# Patient Record
Sex: MALE | Race: WHITE
[De-identification: names, ages, dates, MRNs, and addresses within clinical notes are randomized per-mention and may not be internally consistent; named-entity substitution may affect disease eponyms.]

---

## 2017-11-27 ENCOUNTER — RESULT REVIEW (OUTPATIENT)
Age: 72
End: 2017-11-27

## 2018-11-06 ENCOUNTER — OUTPATIENT (OUTPATIENT)
Dept: OUTPATIENT SERVICES | Facility: HOSPITAL | Age: 73
LOS: 1 days | End: 2018-11-06
Payer: MEDICARE

## 2018-11-06 ENCOUNTER — APPOINTMENT (OUTPATIENT)
Dept: CT IMAGING | Facility: CLINIC | Age: 73
End: 2018-11-06
Payer: MEDICARE

## 2018-11-06 DIAGNOSIS — Z00.8 ENCOUNTER FOR OTHER GENERAL EXAMINATION: ICD-10-CM

## 2018-11-06 PROCEDURE — 70450 CT HEAD/BRAIN W/O DYE: CPT

## 2018-11-06 PROCEDURE — 70450 CT HEAD/BRAIN W/O DYE: CPT | Mod: 26

## 2020-03-11 ENCOUNTER — APPOINTMENT (OUTPATIENT)
Dept: MRI IMAGING | Facility: CLINIC | Age: 75
End: 2020-03-11
Payer: MEDICARE

## 2020-03-11 ENCOUNTER — OUTPATIENT (OUTPATIENT)
Dept: OUTPATIENT SERVICES | Facility: HOSPITAL | Age: 75
LOS: 1 days | End: 2020-03-11
Payer: MEDICARE

## 2020-03-11 DIAGNOSIS — Z00.8 ENCOUNTER FOR OTHER GENERAL EXAMINATION: ICD-10-CM

## 2020-03-11 PROCEDURE — 73721 MRI JNT OF LWR EXTRE W/O DYE: CPT

## 2020-03-11 PROCEDURE — 73721 MRI JNT OF LWR EXTRE W/O DYE: CPT | Mod: 26,RT

## 2021-01-02 ENCOUNTER — EMERGENCY (EMERGENCY)
Facility: HOSPITAL | Age: 76
LOS: 0 days | Discharge: HOME | End: 2021-01-02
Attending: EMERGENCY MEDICINE
Payer: MEDICARE

## 2021-01-02 VITALS
RESPIRATION RATE: 17 BRPM | DIASTOLIC BLOOD PRESSURE: 55 MMHG | OXYGEN SATURATION: 98 % | TEMPERATURE: 98 F | HEART RATE: 72 BPM | SYSTOLIC BLOOD PRESSURE: 172 MMHG

## 2021-01-02 VITALS — WEIGHT: 169.98 LBS | HEIGHT: 67 IN

## 2021-01-02 DIAGNOSIS — E11.51 TYPE 2 DIABETES MELLITUS WITH DIABETIC PERIPHERAL ANGIOPATHY WITHOUT GANGRENE: ICD-10-CM

## 2021-01-02 DIAGNOSIS — M79.661 PAIN IN RIGHT LOWER LEG: ICD-10-CM

## 2021-01-02 DIAGNOSIS — Z87.19 PERSONAL HISTORY OF OTHER DISEASES OF THE DIGESTIVE SYSTEM: ICD-10-CM

## 2021-01-02 DIAGNOSIS — Z95.9 PRESENCE OF CARDIAC AND VASCULAR IMPLANT AND GRAFT, UNSPECIFIED: ICD-10-CM

## 2021-01-02 DIAGNOSIS — I10 ESSENTIAL (PRIMARY) HYPERTENSION: ICD-10-CM

## 2021-01-02 DIAGNOSIS — I70.203 UNSPECIFIED ATHEROSCLEROSIS OF NATIVE ARTERIES OF EXTREMITIES, BILATERAL LEGS: ICD-10-CM

## 2021-01-02 DIAGNOSIS — Z88.8 ALLERGY STATUS TO OTHER DRUGS, MEDICAMENTS AND BIOLOGICAL SUBSTANCES: ICD-10-CM

## 2021-01-02 DIAGNOSIS — Z95.5 PRESENCE OF CORONARY ANGIOPLASTY IMPLANT AND GRAFT: ICD-10-CM

## 2021-01-02 DIAGNOSIS — I25.119 ATHEROSCLEROTIC HEART DISEASE OF NATIVE CORONARY ARTERY WITH UNSPECIFIED ANGINA PECTORIS: ICD-10-CM

## 2021-01-02 DIAGNOSIS — Z90.89 ACQUIRED ABSENCE OF OTHER ORGANS: ICD-10-CM

## 2021-01-02 DIAGNOSIS — E78.5 HYPERLIPIDEMIA, UNSPECIFIED: ICD-10-CM

## 2021-01-02 DIAGNOSIS — Z20.822 CONTACT WITH AND (SUSPECTED) EXPOSURE TO COVID-19: ICD-10-CM

## 2021-01-02 DIAGNOSIS — I74.3 EMBOLISM AND THROMBOSIS OF ARTERIES OF THE LOWER EXTREMITIES: ICD-10-CM

## 2021-01-02 LAB
ALBUMIN SERPL ELPH-MCNC: 4 G/DL — SIGNIFICANT CHANGE UP (ref 3.3–5)
ALP SERPL-CCNC: 102 U/L — SIGNIFICANT CHANGE UP (ref 40–120)
ALT FLD-CCNC: 21 U/L — SIGNIFICANT CHANGE UP (ref 12–78)
ANION GAP SERPL CALC-SCNC: 2 MMOL/L — LOW (ref 5–17)
APTT BLD: 36.3 SEC — HIGH (ref 27.5–35.5)
AST SERPL-CCNC: 22 U/L — SIGNIFICANT CHANGE UP (ref 15–37)
BASOPHILS # BLD AUTO: 0.08 K/UL — SIGNIFICANT CHANGE UP (ref 0–0.2)
BASOPHILS NFR BLD AUTO: 1 % — SIGNIFICANT CHANGE UP (ref 0–2)
BILIRUB SERPL-MCNC: 0.3 MG/DL — SIGNIFICANT CHANGE UP (ref 0.2–1.2)
BUN SERPL-MCNC: 27 MG/DL — HIGH (ref 7–23)
CALCIUM SERPL-MCNC: 9.6 MG/DL — SIGNIFICANT CHANGE UP (ref 8.5–10.1)
CHLORIDE SERPL-SCNC: 108 MMOL/L — SIGNIFICANT CHANGE UP (ref 96–108)
CO2 SERPL-SCNC: 30 MMOL/L — SIGNIFICANT CHANGE UP (ref 22–31)
CREAT SERPL-MCNC: 1.32 MG/DL — HIGH (ref 0.5–1.3)
EOSINOPHIL # BLD AUTO: 0.2 K/UL — SIGNIFICANT CHANGE UP (ref 0–0.5)
EOSINOPHIL NFR BLD AUTO: 2.6 % — SIGNIFICANT CHANGE UP (ref 0–6)
GLUCOSE SERPL-MCNC: 58 MG/DL — LOW (ref 70–99)
HCT VFR BLD CALC: 35.5 % — LOW (ref 39–50)
HGB BLD-MCNC: 11.6 G/DL — LOW (ref 13–17)
IMM GRANULOCYTES NFR BLD AUTO: 0.3 % — SIGNIFICANT CHANGE UP (ref 0–1.5)
INR BLD: 0.96 RATIO — SIGNIFICANT CHANGE UP (ref 0.88–1.16)
LYMPHOCYTES # BLD AUTO: 2.28 K/UL — SIGNIFICANT CHANGE UP (ref 1–3.3)
LYMPHOCYTES # BLD AUTO: 29.7 % — SIGNIFICANT CHANGE UP (ref 13–44)
MCHC RBC-ENTMCNC: 30.3 PG — SIGNIFICANT CHANGE UP (ref 27–34)
MCHC RBC-ENTMCNC: 32.7 GM/DL — SIGNIFICANT CHANGE UP (ref 32–36)
MCV RBC AUTO: 92.7 FL — SIGNIFICANT CHANGE UP (ref 80–100)
MONOCYTES # BLD AUTO: 0.68 K/UL — SIGNIFICANT CHANGE UP (ref 0–0.9)
MONOCYTES NFR BLD AUTO: 8.9 % — SIGNIFICANT CHANGE UP (ref 2–14)
NEUTROPHILS # BLD AUTO: 4.41 K/UL — SIGNIFICANT CHANGE UP (ref 1.8–7.4)
NEUTROPHILS NFR BLD AUTO: 57.5 % — SIGNIFICANT CHANGE UP (ref 43–77)
PLATELET # BLD AUTO: 356 K/UL — SIGNIFICANT CHANGE UP (ref 150–400)
POTASSIUM SERPL-MCNC: 4.5 MMOL/L — SIGNIFICANT CHANGE UP (ref 3.5–5.3)
POTASSIUM SERPL-SCNC: 4.5 MMOL/L — SIGNIFICANT CHANGE UP (ref 3.5–5.3)
PROT SERPL-MCNC: 8.1 GM/DL — SIGNIFICANT CHANGE UP (ref 6–8.3)
PROTHROM AB SERPL-ACNC: 11.2 SEC — SIGNIFICANT CHANGE UP (ref 10.6–13.6)
RBC # BLD: 3.83 M/UL — LOW (ref 4.2–5.8)
RBC # FLD: 13.2 % — SIGNIFICANT CHANGE UP (ref 10.3–14.5)
SARS-COV-2 RNA SPEC QL NAA+PROBE: SIGNIFICANT CHANGE UP
SODIUM SERPL-SCNC: 140 MMOL/L — SIGNIFICANT CHANGE UP (ref 135–145)
WBC # BLD: 7.67 K/UL — SIGNIFICANT CHANGE UP (ref 3.8–10.5)
WBC # FLD AUTO: 7.67 K/UL — SIGNIFICANT CHANGE UP (ref 3.8–10.5)

## 2021-01-02 PROCEDURE — 99285 EMERGENCY DEPT VISIT HI MDM: CPT

## 2021-01-02 PROCEDURE — 93971 EXTREMITY STUDY: CPT | Mod: 26,RT

## 2021-01-02 PROCEDURE — 93010 ELECTROCARDIOGRAM REPORT: CPT

## 2021-01-02 PROCEDURE — 99283 EMERGENCY DEPT VISIT LOW MDM: CPT

## 2021-01-02 PROCEDURE — 75635 CT ANGIO ABDOMINAL ARTERIES: CPT | Mod: 26

## 2021-01-02 PROCEDURE — 93926 LOWER EXTREMITY STUDY: CPT | Mod: 26,RT

## 2021-01-02 PROCEDURE — 71046 X-RAY EXAM CHEST 2 VIEWS: CPT | Mod: 26

## 2021-01-02 RX ORDER — DEXTROSE 50 % IN WATER 50 %
50 SYRINGE (ML) INTRAVENOUS ONCE
Refills: 0 | Status: COMPLETED | OUTPATIENT
Start: 2021-01-02 | End: 2021-01-02

## 2021-01-02 RX ORDER — HEPARIN SODIUM 5000 [USP'U]/ML
INJECTION INTRAVENOUS; SUBCUTANEOUS
Qty: 25000 | Refills: 0 | Status: DISCONTINUED | OUTPATIENT
Start: 2021-01-02 | End: 2021-01-02

## 2021-01-02 RX ORDER — HEPARIN SODIUM 5000 [USP'U]/ML
3000 INJECTION INTRAVENOUS; SUBCUTANEOUS EVERY 6 HOURS
Refills: 0 | Status: DISCONTINUED | OUTPATIENT
Start: 2021-01-02 | End: 2021-01-02

## 2021-01-02 RX ORDER — HEPARIN SODIUM 5000 [USP'U]/ML
6500 INJECTION INTRAVENOUS; SUBCUTANEOUS ONCE
Refills: 0 | Status: DISCONTINUED | OUTPATIENT
Start: 2021-01-02 | End: 2021-01-02

## 2021-01-02 RX ORDER — HEPARIN SODIUM 5000 [USP'U]/ML
6500 INJECTION INTRAVENOUS; SUBCUTANEOUS EVERY 6 HOURS
Refills: 0 | Status: DISCONTINUED | OUTPATIENT
Start: 2021-01-02 | End: 2021-01-02

## 2021-01-02 RX ORDER — APIXABAN 2.5 MG/1
5 TABLET, FILM COATED ORAL ONCE
Refills: 0 | Status: COMPLETED | OUTPATIENT
Start: 2021-01-02 | End: 2021-01-02

## 2021-01-02 RX ORDER — APIXABAN 2.5 MG/1
1 TABLET, FILM COATED ORAL
Qty: 14 | Refills: 0
Start: 2021-01-02 | End: 2021-01-08

## 2021-01-02 RX ADMIN — APIXABAN 5 MILLIGRAM(S): 2.5 TABLET, FILM COATED ORAL at 19:32

## 2021-01-02 RX ADMIN — HEPARIN SODIUM 1500 UNIT(S)/HR: 5000 INJECTION INTRAVENOUS; SUBCUTANEOUS at 17:48

## 2021-01-02 RX ADMIN — HEPARIN SODIUM 6500 UNIT(S): 5000 INJECTION INTRAVENOUS; SUBCUTANEOUS at 17:47

## 2021-01-02 RX ADMIN — Medication 50 MILLILITER(S): at 16:19

## 2021-01-02 NOTE — ED STATDOCS - PROGRESS NOTE DETAILS
75 yr. old male PMH: CAD, Stent X5,HTN,HLD, Type 2 Diabetes presents to ED with left calf pain after working out at the [employer] . Reports right femoral stent placed on August 31st. No fever or chills. Seen and examined by attending in intake. Plan: US LLE Will F/U with results and re evaluate. Van COOK Rashida CONLEY for ED attending, Dr. Sinclair: Discussed with [facility] vascular resident who asks to start heparin and will come see pt. Rashida CONLEY for ED attending, Dr. Sinclair: Discussed with pt's vascular doctor who states [facility] is not accepting transfers and he will call Dr. Martin on for vascular surgery here for pt to be consulted in [facility]. Contacted Dr. Crooks [phone] and discussed case with dr. Reynolds . Van NP Patient reports he has no GI (rectal)  bleeding or bleeding from anywhere else. Does bruise easily. Currently on aspirin and took aspirin 650 mg. PO today at 10 am. Van COOK Dextrose 50% 50 ml IV ordered for blood glucose 0f 58. MTangredi NP discussed with vascularly surgery who asks to stop heparin, give one dose of elliquis here and dc home on elliquis 5mg bid to f/u with his vascular surgery Valeriy

## 2021-01-02 NOTE — ED ADULT TRIAGE NOTE - CHIEF COMPLAINT QUOTE
Pt reports prior surgery on right femoral artery with 3 stents placed. Reports total of 6 stents in right femoral artery. Reports good recovery. Reports that he has developed right calf pain.  Denies SOB.

## 2021-01-02 NOTE — ED STATDOCS - CARE PROVIDER_API CALL
SHILPA FUENTES  Vascular Surgery  [address]  [place] [address]  Phone: [phone]  Fax: [phone]  Follow Up Time: 4-6 Days

## 2021-01-02 NOTE — ED STATDOCS - NSCAREINITIATED _GEN_ER
diminished breath sounds, R/diminished breath sounds, L
diminished breath sounds, R
Ruth Sinclair(Attending)

## 2021-01-02 NOTE — CONSULT NOTE ADULT - ASSESSMENT
76 yo M presents with RLE claudication with occlusion of right femoral stent     Plan:   please obtain CTA abdomen pelvis with runoff to the bilateral lower extremities   imaging will determine admission vs outpatient intervention     plan discussed with Dr. Ahmadi  74 yo M presents with RLE claudication with occlusion of right femoral stent     Plan:   reviewed all the imaging with attending   patient has decrease blood flow but on CTA appear patent, no acute limb ischemia   stop heparin drip and give one 5mg eliquis dosage in the ED and send 5mg eliquis BID for 7 days until he can follow up with his vascular surgeon for endovascualr intervention       plan discussed with Dr. Ahmadi

## 2021-01-02 NOTE — CONSULT NOTE ADULT - ATTENDING COMMENTS
Patient evaluated at the bedside. CTA and US reviewed. Patient currently has RLE claudication after exercising at the gym. Denies tissue loss or rest pain. Case discussed with Dr Crooks. Imaging is showing R distal EIA stent stenosis that will benefit from angioplasty. Patient will be started on Eliquis upon DC. Will follow up with Dr Crooks this coming week. Lovenox to be given prior to DC.

## 2021-01-02 NOTE — ED STATDOCS - CLINICAL SUMMARY MEDICAL DECISION MAKING FREE TEXT BOX
76 y/o M with recent R leg arterial stenting presents to the ED c/o R calf pain s/p exercising. Will obtain US of RLE r/o clot and reassess.

## 2021-01-02 NOTE — CONSULT NOTE ADULT - SUBJECTIVE AND OBJECTIVE BOX
76 y/o M with PMHx of CAD s/p coronary artery stent placement x5, HTN, HLD, DM, PVD s/p 2 stents to LLE, s/p R femoral artery repair, and s/p appendectomy presents with right LLE claudication. Patient reports that he had recent intervention at [facility] and was sent on ASA. Patient reports that recently he has pain when working out and walking but not at rest. Patient denies motor or sensory deficits.     PMH: CAD, HTN, HLD, DM, PVD  PSH: appendectomy, angiogram, angioplasty, and stent placement (3 LLE and 5 RLE), Cardiac stents   Allx: NDKA     Vital Signs Last 24 Hrs  T(C): 36.7 (02 Jan 2021 12:25), Max: 36.7 (02 Jan 2021 12:25)  T(F): 98.1 (02 Jan 2021 12:25), Max: 98.1 (02 Jan 2021 12:25)  HR: 55 (02 Jan 2021 12:25) (55 - 55)  BP: 159/59 (02 Jan 2021 12:25) (159/59 - 159/59)  BP(mean): 78 (02 Jan 2021 12:25) (78 - 78)  RR: 18 (02 Jan 2021 12:25) (18 - 18)  SpO2: 97% (02 Jan 2021 12:25) (97% - 97%)    Gen: NAD, cooperative   HEENT: supple, no JVD, EOMI  Lungs: CTA b/l, aerating well   CV: S1 and S2 present  Abd: BS present, NT,ND, no RGR   Ext: FROM, RLE non palpable pulses, warm to touch, sensory and motor intact, No edema   Skin: warm, dry     Labs                         11.6   7.67  )-----------( 356      ( 02 Jan 2021 15:08 )             35.5   01-02    140  |  108  |  27<H>  ----------------------------<  58<L>  4.5   |  30  |  1.32<H>    Ca    9.6      02 Jan 2021 15:08    TPro  8.1  /  Alb  4.0  /  TBili  0.3  /  DBili  x   /  AST  22  /  ALT  21  /  AlkPhos  102  01-02  PT/INR - ( 02 Jan 2021 15:08 )   PT: 11.2 sec;   INR: 0.96 ratio         PTT - ( 02 Jan 2021 15:08 )  PTT:36.3 sec      EXAM:  US DPLX LWR EXT ARTS LTD RT                            PROCEDURE DATE:  01/02/2021          INTERPRETATION:  US LOWER EXTREMITY ARTERIAL DUPLEX LIMITED RIGHT    CLINICAL INFORMATION: Peripheral artery disease status post stent placement withright leg pain, claudication    COMPARISON: None    Real-time sonography of the right lower extremity arterial system was performed using a high-resolution linear array transducer and including color and spectral Doppler.    There is extensive plaquein the lower extremity arteries.. No soft tissue abnormalities are demonstrated in the right lower extremity.  Flow phase patterns and peak systolic velocity measurements (in cm/s) were observed as follows:    RIGHT:  CFA: Occluded;  Proximal SFA: Occluded;  Mid SFA: Occluded;  Distal SFA: Occluded;  Popliteal: Monophasic;  33  Anterior tibial: Occluded;  Posterior tibial: Monophasic; 70  Peroneal: Monophasic;  42  Dorsalis pedis: Monophasic;  29    IMPRESSION:    Total occlusion of stents in theright common femoral and proximal-distal femoral arteries. Patent profunda. Reconstitution of monophasic flow in the popliteal and infrapopliteal arteries. Anterior tibial remains occluded.            PAUL MICHELLE MD; Attending Radiologist  This document has been electronically signed. Jan 2 2021  2:34PM     74 y/o M with PMHx of CAD s/p coronary artery stent placement x5, HTN, HLD, DM, PVD s/p 2 stents to LLE, s/p R femoral artery repair, and s/p appendectomy presents with right LLE claudication. Patient reports that he had recent intervention at [facility] and was sent on ASA. Patient reports that recently he has pain when working out and walking but not at rest. Patient denies motor or sensory deficits. Last vascular intervention was august 2020.     PMH: CAD, HTN, HLD, DM, PVD  PSH: appendectomy, angiogram, angioplasty, and stent placement (3 LLE and 5 RLE), Cardiac stents   Allx: NDKA     Vital Signs Last 24 Hrs  T(C): 36.7 (02 Jan 2021 12:25), Max: 36.7 (02 Jan 2021 12:25)  T(F): 98.1 (02 Jan 2021 12:25), Max: 98.1 (02 Jan 2021 12:25)  HR: 55 (02 Jan 2021 12:25) (55 - 55)  BP: 159/59 (02 Jan 2021 12:25) (159/59 - 159/59)  BP(mean): 78 (02 Jan 2021 12:25) (78 - 78)  RR: 18 (02 Jan 2021 12:25) (18 - 18)  SpO2: 97% (02 Jan 2021 12:25) (97% - 97%)    Gen: NAD, cooperative   HEENT: supple, no JVD, EOMI  Lungs: CTA b/l, aerating well   CV: S1 and S2 present  Abd: BS present, NT,ND, no RGR   Ext: FROM, RLE non palpable pulses, warm to touch, sensory and motor intact, No edema   Skin: warm, dry     Labs                         11.6   7.67  )-----------( 356      ( 02 Jan 2021 15:08 )             35.5   01-02    140  |  108  |  27<H>  ----------------------------<  58<L>  4.5   |  30  |  1.32<H>    Ca    9.6      02 Jan 2021 15:08    TPro  8.1  /  Alb  4.0  /  TBili  0.3  /  DBili  x   /  AST  22  /  ALT  21  /  AlkPhos  102  01-02  PT/INR - ( 02 Jan 2021 15:08 )   PT: 11.2 sec;   INR: 0.96 ratio         PTT - ( 02 Jan 2021 15:08 )  PTT:36.3 sec      EXAM:  US DPLX LWR EXT ARTS LTD RT                            PROCEDURE DATE:  01/02/2021          INTERPRETATION:  US LOWER EXTREMITY ARTERIAL DUPLEX LIMITED RIGHT    CLINICAL INFORMATION: Peripheral artery disease status post stent placement withright leg pain, claudication    COMPARISON: None    Real-time sonography of the right lower extremity arterial system was performed using a high-resolution linear array transducer and including color and spectral Doppler.    There is extensive plaquein the lower extremity arteries.. No soft tissue abnormalities are demonstrated in the right lower extremity.  Flow phase patterns and peak systolic velocity measurements (in cm/s) were observed as follows:    RIGHT:  CFA: Occluded;  Proximal SFA: Occluded;  Mid SFA: Occluded;  Distal SFA: Occluded;  Popliteal: Monophasic;  33  Anterior tibial: Occluded;  Posterior tibial: Monophasic; 70  Peroneal: Monophasic;  42  Dorsalis pedis: Monophasic;  29    IMPRESSION:    Total occlusion of stents in theright common femoral and proximal-distal femoral arteries. Patent profunda. Reconstitution of monophasic flow in the popliteal and infrapopliteal arteries. Anterior tibial remains occluded.            PAUL MICHELLE MD; Attending Radiologist  This document has been electronically signed. Jan 2 2021  2:34PM

## 2021-01-02 NOTE — ED STATDOCS - PMH
Anginal pain    CAD (coronary artery disease)    Diabetes mellitus    ED (erectile dysfunction)    History of hyperlipidemia    HTN - Hypertension    Hx of gout  last flare-up 1 year ago  PVD (peripheral vascular disease)

## 2021-01-02 NOTE — ED STATDOCS - PSH
H/O colonoscopy with polypectomy    h/o femoral artery repair  Right femoral artery with blood clots excised after complications from an angioseal at [facility] 2000.  H/O shoulder surgery  right and left  H/O therapeutic radiation  intra arterial after coronary stent placed at [facility] 2003  History of appendectomy    PVD (peripheral vascular disease)  Stents x 2 Left Leg  S/P primary angioplasty with coronary stents x 5

## 2021-01-02 NOTE — ED STATDOCS - OBJECTIVE STATEMENT
76 y/o M with PMHx of CAD s/p coronary artery stent placement x5, HTN, HLD, DM, PVD s/p 2 stents to LLE, s/p R femoral artery repair, and s/p appendectomy presents to the ED c/o +R calf pain s/p exercising. Had recent arterial stents placed to RLE. No SOB, chest pain, or fever.

## 2021-01-02 NOTE — ED STATDOCS - ATTENDING CONTRIBUTION TO CARE
I, Ruth Sinclair MD, performed the initial face to face bedside interview with this patient regarding history of present illness, review of symptoms and relevant past medical, social and family history.  I completed an independent physical examination.  I was the initial provider who evaluated this patient. I have signed out the follow up of any pending tests (i.e. labs, radiological studies) to the ACP.  I have communicated the patient’s plan of care and disposition with the ACP.  The history, relevant review of systems, past medical and surgical history, medical decision making, and physical examination was documented by the scribe in my presence and I attest to the accuracy of the documentation.

## 2021-01-03 ENCOUNTER — INPATIENT (INPATIENT)
Facility: HOSPITAL | Age: 76
LOS: 0 days | Discharge: HOME | DRG: 313 | End: 2021-01-04
Attending: HOSPITALIST | Admitting: HOSPITALIST
Payer: MEDICARE

## 2021-01-03 VITALS
HEIGHT: 67 IN | WEIGHT: 149.91 LBS | OXYGEN SATURATION: 100 % | DIASTOLIC BLOOD PRESSURE: 62 MMHG | SYSTOLIC BLOOD PRESSURE: 125 MMHG | HEART RATE: 54 BPM | RESPIRATION RATE: 18 BRPM

## 2021-01-03 DIAGNOSIS — R07.9 CHEST PAIN, UNSPECIFIED: ICD-10-CM

## 2021-01-03 LAB
ALBUMIN SERPL ELPH-MCNC: 4.4 G/DL — SIGNIFICANT CHANGE UP (ref 3.3–5)
ALP SERPL-CCNC: 104 U/L — SIGNIFICANT CHANGE UP (ref 40–120)
ALT FLD-CCNC: 22 U/L — SIGNIFICANT CHANGE UP (ref 12–78)
ANION GAP SERPL CALC-SCNC: 10 MMOL/L — SIGNIFICANT CHANGE UP (ref 5–17)
ANION GAP SERPL CALC-SCNC: 5 MMOL/L — SIGNIFICANT CHANGE UP (ref 5–17)
APPEARANCE UR: CLEAR — SIGNIFICANT CHANGE UP
APTT BLD: 35.1 SEC — SIGNIFICANT CHANGE UP (ref 27.5–35.5)
AST SERPL-CCNC: 22 U/L — SIGNIFICANT CHANGE UP (ref 15–37)
BASOPHILS # BLD AUTO: 0.07 K/UL — SIGNIFICANT CHANGE UP (ref 0–0.2)
BASOPHILS NFR BLD AUTO: 0.6 % — SIGNIFICANT CHANGE UP (ref 0–2)
BILIRUB SERPL-MCNC: 0.5 MG/DL — SIGNIFICANT CHANGE UP (ref 0.2–1.2)
BILIRUB UR-MCNC: NEGATIVE — SIGNIFICANT CHANGE UP
BUN SERPL-MCNC: 19 MG/DL — SIGNIFICANT CHANGE UP (ref 7–23)
BUN SERPL-MCNC: 21 MG/DL — SIGNIFICANT CHANGE UP (ref 7–23)
CALCIUM SERPL-MCNC: 10.3 MG/DL — HIGH (ref 8.5–10.1)
CALCIUM SERPL-MCNC: 9 MG/DL — SIGNIFICANT CHANGE UP (ref 8.5–10.1)
CHLORIDE SERPL-SCNC: 106 MMOL/L — SIGNIFICANT CHANGE UP (ref 96–108)
CHLORIDE SERPL-SCNC: 108 MMOL/L — SIGNIFICANT CHANGE UP (ref 96–108)
CO2 SERPL-SCNC: 23 MMOL/L — SIGNIFICANT CHANGE UP (ref 22–31)
CO2 SERPL-SCNC: 27 MMOL/L — SIGNIFICANT CHANGE UP (ref 22–31)
COLOR SPEC: YELLOW — SIGNIFICANT CHANGE UP
CREAT SERPL-MCNC: 1.17 MG/DL — SIGNIFICANT CHANGE UP (ref 0.5–1.3)
CREAT SERPL-MCNC: 1.41 MG/DL — HIGH (ref 0.5–1.3)
DIFF PNL FLD: NEGATIVE — SIGNIFICANT CHANGE UP
EOSINOPHIL # BLD AUTO: 0.05 K/UL — SIGNIFICANT CHANGE UP (ref 0–0.5)
EOSINOPHIL NFR BLD AUTO: 0.4 % — SIGNIFICANT CHANGE UP (ref 0–6)
GLUCOSE SERPL-MCNC: 101 MG/DL — HIGH (ref 70–99)
GLUCOSE SERPL-MCNC: 98 MG/DL — SIGNIFICANT CHANGE UP (ref 70–99)
GLUCOSE UR QL: NEGATIVE MG/DL — SIGNIFICANT CHANGE UP
HCT VFR BLD CALC: 35.9 % — LOW (ref 39–50)
HCT VFR BLD CALC: 38.7 % — LOW (ref 39–50)
HGB BLD-MCNC: 11.3 G/DL — LOW (ref 13–17)
HGB BLD-MCNC: 12.9 G/DL — LOW (ref 13–17)
IMM GRANULOCYTES NFR BLD AUTO: 0.3 % — SIGNIFICANT CHANGE UP (ref 0–1.5)
INR BLD: 1.05 RATIO — SIGNIFICANT CHANGE UP (ref 0.88–1.16)
KETONES UR-MCNC: ABNORMAL
LACTATE SERPL-SCNC: 1.1 MMOL/L — SIGNIFICANT CHANGE UP (ref 0.7–2)
LACTATE SERPL-SCNC: 1.8 MMOL/L — SIGNIFICANT CHANGE UP (ref 0.7–2)
LACTATE SERPL-SCNC: 3.4 MMOL/L — HIGH (ref 0.7–2)
LEUKOCYTE ESTERASE UR-ACNC: NEGATIVE — SIGNIFICANT CHANGE UP
LIDOCAIN IGE QN: 122 U/L — SIGNIFICANT CHANGE UP (ref 73–393)
LYMPHOCYTES # BLD AUTO: 1.88 K/UL — SIGNIFICANT CHANGE UP (ref 1–3.3)
LYMPHOCYTES # BLD AUTO: 15.7 % — SIGNIFICANT CHANGE UP (ref 13–44)
MAGNESIUM SERPL-MCNC: 1.8 MG/DL — SIGNIFICANT CHANGE UP (ref 1.6–2.6)
MCHC RBC-ENTMCNC: 29.5 PG — SIGNIFICANT CHANGE UP (ref 27–34)
MCHC RBC-ENTMCNC: 30.1 PG — SIGNIFICANT CHANGE UP (ref 27–34)
MCHC RBC-ENTMCNC: 31.5 GM/DL — LOW (ref 32–36)
MCHC RBC-ENTMCNC: 33.3 GM/DL — SIGNIFICANT CHANGE UP (ref 32–36)
MCV RBC AUTO: 90.4 FL — SIGNIFICANT CHANGE UP (ref 80–100)
MCV RBC AUTO: 93.7 FL — SIGNIFICANT CHANGE UP (ref 80–100)
MONOCYTES # BLD AUTO: 0.89 K/UL — SIGNIFICANT CHANGE UP (ref 0–0.9)
MONOCYTES NFR BLD AUTO: 7.4 % — SIGNIFICANT CHANGE UP (ref 2–14)
NEUTROPHILS # BLD AUTO: 9.04 K/UL — HIGH (ref 1.8–7.4)
NEUTROPHILS NFR BLD AUTO: 75.6 % — SIGNIFICANT CHANGE UP (ref 43–77)
NITRITE UR-MCNC: NEGATIVE — SIGNIFICANT CHANGE UP
NT-PROBNP SERPL-SCNC: 309 PG/ML — SIGNIFICANT CHANGE UP (ref 0–450)
PCP SPEC-MCNC: SIGNIFICANT CHANGE UP
PH UR: 8 — SIGNIFICANT CHANGE UP (ref 5–8)
PHOSPHATE SERPL-MCNC: 4.2 MG/DL — SIGNIFICANT CHANGE UP (ref 2.5–4.5)
PLATELET # BLD AUTO: 368 K/UL — SIGNIFICANT CHANGE UP (ref 150–400)
PLATELET # BLD AUTO: 430 K/UL — HIGH (ref 150–400)
POTASSIUM SERPL-MCNC: 4 MMOL/L — SIGNIFICANT CHANGE UP (ref 3.5–5.3)
POTASSIUM SERPL-MCNC: 4.2 MMOL/L — SIGNIFICANT CHANGE UP (ref 3.5–5.3)
POTASSIUM SERPL-SCNC: 4 MMOL/L — SIGNIFICANT CHANGE UP (ref 3.5–5.3)
POTASSIUM SERPL-SCNC: 4.2 MMOL/L — SIGNIFICANT CHANGE UP (ref 3.5–5.3)
PROT SERPL-MCNC: 8.6 GM/DL — HIGH (ref 6–8.3)
PROT UR-MCNC: NEGATIVE MG/DL — SIGNIFICANT CHANGE UP
PROTHROM AB SERPL-ACNC: 12.2 SEC — SIGNIFICANT CHANGE UP (ref 10.6–13.6)
RBC # BLD: 3.83 M/UL — LOW (ref 4.2–5.8)
RBC # BLD: 4.28 M/UL — SIGNIFICANT CHANGE UP (ref 4.2–5.8)
RBC # FLD: 13.2 % — SIGNIFICANT CHANGE UP (ref 10.3–14.5)
RBC # FLD: 13.2 % — SIGNIFICANT CHANGE UP (ref 10.3–14.5)
SARS-COV-2 RNA SPEC QL NAA+PROBE: SIGNIFICANT CHANGE UP
SODIUM SERPL-SCNC: 139 MMOL/L — SIGNIFICANT CHANGE UP (ref 135–145)
SODIUM SERPL-SCNC: 140 MMOL/L — SIGNIFICANT CHANGE UP (ref 135–145)
SP GR SPEC: 1.01 — SIGNIFICANT CHANGE UP (ref 1.01–1.02)
TROPONIN I SERPL-MCNC: <0.015 NG/ML — SIGNIFICANT CHANGE UP (ref 0.01–0.04)
TROPONIN I SERPL-MCNC: <0.015 NG/ML — SIGNIFICANT CHANGE UP (ref 0.01–0.04)
TSH SERPL-MCNC: 0.7 UU/ML — SIGNIFICANT CHANGE UP (ref 0.34–4.82)
UROBILINOGEN FLD QL: NEGATIVE MG/DL — SIGNIFICANT CHANGE UP
WBC # BLD: 11.97 K/UL — HIGH (ref 3.8–10.5)
WBC # BLD: 8.29 K/UL — SIGNIFICANT CHANGE UP (ref 3.8–10.5)
WBC # FLD AUTO: 11.97 K/UL — HIGH (ref 3.8–10.5)
WBC # FLD AUTO: 8.29 K/UL — SIGNIFICANT CHANGE UP (ref 3.8–10.5)

## 2021-01-03 PROCEDURE — 84100 ASSAY OF PHOSPHORUS: CPT

## 2021-01-03 PROCEDURE — 84484 ASSAY OF TROPONIN QUANT: CPT

## 2021-01-03 PROCEDURE — 80048 BASIC METABOLIC PNL TOTAL CA: CPT

## 2021-01-03 PROCEDURE — 71045 X-RAY EXAM CHEST 1 VIEW: CPT | Mod: 26

## 2021-01-03 PROCEDURE — 70450 CT HEAD/BRAIN W/O DYE: CPT | Mod: 26

## 2021-01-03 PROCEDURE — 93010 ELECTROCARDIOGRAM REPORT: CPT

## 2021-01-03 PROCEDURE — 36415 COLL VENOUS BLD VENIPUNCTURE: CPT

## 2021-01-03 PROCEDURE — 83036 HEMOGLOBIN GLYCOSYLATED A1C: CPT

## 2021-01-03 PROCEDURE — 83605 ASSAY OF LACTIC ACID: CPT

## 2021-01-03 PROCEDURE — 81003 URINALYSIS AUTO W/O SCOPE: CPT

## 2021-01-03 PROCEDURE — 83735 ASSAY OF MAGNESIUM: CPT

## 2021-01-03 PROCEDURE — 86769 SARS-COV-2 COVID-19 ANTIBODY: CPT

## 2021-01-03 PROCEDURE — 85027 COMPLETE CBC AUTOMATED: CPT

## 2021-01-03 PROCEDURE — 84443 ASSAY THYROID STIM HORMONE: CPT

## 2021-01-03 PROCEDURE — 74177 CT ABD & PELVIS W/CONTRAST: CPT | Mod: 26

## 2021-01-03 PROCEDURE — 80307 DRUG TEST PRSMV CHEM ANLYZR: CPT

## 2021-01-03 PROCEDURE — 80061 LIPID PANEL: CPT

## 2021-01-03 PROCEDURE — 82962 GLUCOSE BLOOD TEST: CPT

## 2021-01-03 PROCEDURE — 93005 ELECTROCARDIOGRAM TRACING: CPT

## 2021-01-03 PROCEDURE — 99223 1ST HOSP IP/OBS HIGH 75: CPT

## 2021-01-03 PROCEDURE — 82550 ASSAY OF CK (CPK): CPT

## 2021-01-03 RX ORDER — INSULIN LISPRO 100/ML
VIAL (ML) SUBCUTANEOUS AT BEDTIME
Refills: 0 | Status: DISCONTINUED | OUTPATIENT
Start: 2021-01-03 | End: 2021-01-04

## 2021-01-03 RX ORDER — LISINOPRIL 2.5 MG/1
20 TABLET ORAL ONCE
Refills: 0 | Status: COMPLETED | OUTPATIENT
Start: 2021-01-03 | End: 2021-01-03

## 2021-01-03 RX ORDER — SODIUM CHLORIDE 9 MG/ML
1000 INJECTION INTRAMUSCULAR; INTRAVENOUS; SUBCUTANEOUS ONCE
Refills: 0 | Status: DISCONTINUED | OUTPATIENT
Start: 2021-01-03 | End: 2021-01-03

## 2021-01-03 RX ORDER — SODIUM CHLORIDE 9 MG/ML
1000 INJECTION, SOLUTION INTRAVENOUS
Refills: 0 | Status: DISCONTINUED | OUTPATIENT
Start: 2021-01-03 | End: 2021-01-04

## 2021-01-03 RX ORDER — SODIUM CHLORIDE 9 MG/ML
1000 INJECTION INTRAMUSCULAR; INTRAVENOUS; SUBCUTANEOUS
Refills: 0 | Status: DISCONTINUED | OUTPATIENT
Start: 2021-01-03 | End: 2021-01-04

## 2021-01-03 RX ORDER — FAMOTIDINE 10 MG/ML
20 INJECTION INTRAVENOUS ONCE
Refills: 0 | Status: COMPLETED | OUTPATIENT
Start: 2021-01-03 | End: 2021-01-03

## 2021-01-03 RX ORDER — DEXTROSE 50 % IN WATER 50 %
25 SYRINGE (ML) INTRAVENOUS ONCE
Refills: 0 | Status: DISCONTINUED | OUTPATIENT
Start: 2021-01-03 | End: 2021-01-04

## 2021-01-03 RX ORDER — ASPIRIN/CALCIUM CARB/MAGNESIUM 324 MG
81 TABLET ORAL DAILY
Refills: 0 | Status: DISCONTINUED | OUTPATIENT
Start: 2021-01-03 | End: 2021-01-04

## 2021-01-03 RX ORDER — DEXTROSE 50 % IN WATER 50 %
12.5 SYRINGE (ML) INTRAVENOUS ONCE
Refills: 0 | Status: DISCONTINUED | OUTPATIENT
Start: 2021-01-03 | End: 2021-01-04

## 2021-01-03 RX ORDER — METOPROLOL TARTRATE 50 MG
25 TABLET ORAL DAILY
Refills: 0 | Status: DISCONTINUED | OUTPATIENT
Start: 2021-01-03 | End: 2021-01-04

## 2021-01-03 RX ORDER — DEXTROSE 50 % IN WATER 50 %
15 SYRINGE (ML) INTRAVENOUS ONCE
Refills: 0 | Status: DISCONTINUED | OUTPATIENT
Start: 2021-01-03 | End: 2021-01-04

## 2021-01-03 RX ORDER — INSULIN GLARGINE 100 [IU]/ML
20 INJECTION, SOLUTION SUBCUTANEOUS AT BEDTIME
Refills: 0 | Status: DISCONTINUED | OUTPATIENT
Start: 2021-01-03 | End: 2021-01-04

## 2021-01-03 RX ORDER — ONDANSETRON 8 MG/1
4 TABLET, FILM COATED ORAL ONCE
Refills: 0 | Status: COMPLETED | OUTPATIENT
Start: 2021-01-03 | End: 2021-01-03

## 2021-01-03 RX ORDER — FENTANYL CITRATE 50 UG/ML
100 INJECTION INTRAVENOUS ONCE
Refills: 0 | Status: DISCONTINUED | OUTPATIENT
Start: 2021-01-03 | End: 2021-01-03

## 2021-01-03 RX ORDER — MORPHINE SULFATE 50 MG/1
2 CAPSULE, EXTENDED RELEASE ORAL
Refills: 0 | Status: DISCONTINUED | OUTPATIENT
Start: 2021-01-03 | End: 2021-01-04

## 2021-01-03 RX ORDER — MORPHINE SULFATE 50 MG/1
4 CAPSULE, EXTENDED RELEASE ORAL ONCE
Refills: 0 | Status: DISCONTINUED | OUTPATIENT
Start: 2021-01-03 | End: 2021-01-03

## 2021-01-03 RX ORDER — LISINOPRIL 2.5 MG/1
20 TABLET ORAL
Refills: 0 | Status: DISCONTINUED | OUTPATIENT
Start: 2021-01-03 | End: 2021-01-04

## 2021-01-03 RX ORDER — GLUCAGON INJECTION, SOLUTION 0.5 MG/.1ML
1 INJECTION, SOLUTION SUBCUTANEOUS ONCE
Refills: 0 | Status: DISCONTINUED | OUTPATIENT
Start: 2021-01-03 | End: 2021-01-04

## 2021-01-03 RX ORDER — INSULIN LISPRO 100/ML
VIAL (ML) SUBCUTANEOUS
Refills: 0 | Status: DISCONTINUED | OUTPATIENT
Start: 2021-01-03 | End: 2021-01-04

## 2021-01-03 RX ORDER — DILTIAZEM HCL 120 MG
120 CAPSULE, EXT RELEASE 24 HR ORAL DAILY
Refills: 0 | Status: DISCONTINUED | OUTPATIENT
Start: 2021-01-03 | End: 2021-01-04

## 2021-01-03 RX ORDER — SODIUM CHLORIDE 9 MG/ML
1000 INJECTION INTRAMUSCULAR; INTRAVENOUS; SUBCUTANEOUS ONCE
Refills: 0 | Status: COMPLETED | OUTPATIENT
Start: 2021-01-03 | End: 2021-01-03

## 2021-01-03 RX ORDER — APIXABAN 2.5 MG/1
5 TABLET, FILM COATED ORAL EVERY 12 HOURS
Refills: 0 | Status: DISCONTINUED | OUTPATIENT
Start: 2021-01-03 | End: 2021-01-04

## 2021-01-03 RX ORDER — ACETAMINOPHEN 500 MG
650 TABLET ORAL EVERY 6 HOURS
Refills: 0 | Status: DISCONTINUED | OUTPATIENT
Start: 2021-01-03 | End: 2021-01-04

## 2021-01-03 RX ADMIN — ONDANSETRON 4 MILLIGRAM(S): 8 TABLET, FILM COATED ORAL at 15:01

## 2021-01-03 RX ADMIN — APIXABAN 5 MILLIGRAM(S): 2.5 TABLET, FILM COATED ORAL at 22:19

## 2021-01-03 RX ADMIN — FAMOTIDINE 20 MILLIGRAM(S): 10 INJECTION INTRAVENOUS at 17:32

## 2021-01-03 RX ADMIN — MORPHINE SULFATE 4 MILLIGRAM(S): 50 CAPSULE, EXTENDED RELEASE ORAL at 16:00

## 2021-01-03 RX ADMIN — SODIUM CHLORIDE 1000 MILLILITER(S): 9 INJECTION INTRAMUSCULAR; INTRAVENOUS; SUBCUTANEOUS at 15:01

## 2021-01-03 RX ADMIN — SODIUM CHLORIDE 1000 MILLILITER(S): 9 INJECTION INTRAMUSCULAR; INTRAVENOUS; SUBCUTANEOUS at 16:35

## 2021-01-03 RX ADMIN — SODIUM CHLORIDE 1000 MILLILITER(S): 9 INJECTION INTRAMUSCULAR; INTRAVENOUS; SUBCUTANEOUS at 16:01

## 2021-01-03 RX ADMIN — SODIUM CHLORIDE 1000 MILLILITER(S): 9 INJECTION INTRAMUSCULAR; INTRAVENOUS; SUBCUTANEOUS at 17:35

## 2021-01-03 RX ADMIN — INSULIN GLARGINE 20 UNIT(S): 100 INJECTION, SOLUTION SUBCUTANEOUS at 22:16

## 2021-01-03 RX ADMIN — MORPHINE SULFATE 4 MILLIGRAM(S): 50 CAPSULE, EXTENDED RELEASE ORAL at 16:35

## 2021-01-03 RX ADMIN — FENTANYL CITRATE 100 MICROGRAM(S): 50 INJECTION INTRAVENOUS at 15:42

## 2021-01-03 RX ADMIN — LISINOPRIL 20 MILLIGRAM(S): 2.5 TABLET ORAL at 17:35

## 2021-01-03 RX ADMIN — Medication 30 MILLILITER(S): at 17:35

## 2021-01-03 RX ADMIN — Medication 1 MILLIGRAM(S): at 16:48

## 2021-01-03 RX ADMIN — FENTANYL CITRATE 100 MICROGRAM(S): 50 INJECTION INTRAVENOUS at 15:10

## 2021-01-03 NOTE — H&P ADULT - NSHPLABSRESULTS_GEN_ALL_CORE
INTERPRETATION:  CT ANGIO ABDOMINAL AORTA RUNOFF WITHOUT AND OR WITH IV CONTRAST    CLINICAL INFORMATION:  Atherosclerosis of the native arteries of the right and left lower extremity with Claudication or leg ischemia, prior revascularization of the right leg    TECHNIQUE: CT angiography of the abdominal aorta and arterial runoff of the lower extremities is performed. The study is performed utilizing a rapid bolus of 125 cc Omnipaque 350 IV contrast. Volume rendered and MIP images for CT angiographic display are created from source data on an independent 3-D workstation and archived into PACS. This study was performed using automatic exposure control (radiation dose reduction software) to obtain a diagnostic image quality scan with patient dose as low as reasonably achievable.    COMPARISON: Same day arterial duplex    FINDINGS:    ABDOMINAL AORTA: Moderate atherosclerotic plaque without aneurysm or occlusion.    Celiac: Normal  SMA: Normal.  NEIL: Normal.  Renal Arteries: Patent.    RIGHT LEG:    Common Iliac artery: Normal caliber patent.  External Iliac: Normal caliber patent.  Internal Iliac: Patent.    Common femoral: Patent stent.  SFA: Complete in-stent occlusion from proximal to distal.  Profunda: Patent.    Popliteal: Reconstituted with slightly attenuated caliber but patent.  Anterior Tibial (AT): Multifocal occlusions.  Tibioperoneal trunk (TPT): Patent.  Posterior Tibial (PT): Normal caliber patent.  Peroneal: Normal caliber patent.  Dorsalis Pedis (DP): Severely attenuated but patent.  Plantar: Patent.    LEFT LEG:    Common Iliac artery: Normal caliber patent.  External Iliac: Normal caliber patent.  Internal Iliac: Patent.    Common femoral: Normal caliber patent.  SFA: Patent stents from proximal to distal.  Profunda: Normal.    Popliteal: Normal caliber patent.  AT: Multifocal occlusions.  TPT: Patent.  PT: Normal caliber patent.  Peroneal: Normal caliber patent.  DP: Attenuated but patent.  Plantar: Patent.    ADDITIONAL FINDINGS: The lung bases are clear. The liver is fatty. The spleen, pancreas, gallbladder, and biliary tree are normal. The kidneys and remainder of the retroperitoneum are normal. No pelvic abnormalities are seen. No bowel-related abnormality. No free air or ascites.    3D image post processing was helpful in evaluating the vascular anatomy, supporting the findings stated above.    IMPRESSION:    AORTOILIAC INFLOW: No significant inflow disease.    RIGHT LEG: Complete in-stent occlusion of the SFA from proximal to distal. Popliteal reconstituted by a patent profunda. Two-vessel runoff to the foot via the PT and peroneal. AT shows multifocal occlusions. DP is severely attenuated but patent. Plantar is patent.    LEFT LEG: Patent femoral-popliteal runoff with patent SFA stent from proximal to distal. Two-vessel runoff to the foot via the PT and peroneal. AT shows multifocal occlusions. DP is attenuated but patent. Plantar is patent.      PAUL MICHELLE MD; Attending Radiologist  This document has been electronically signed. Jan 2 2021  6:25PM

## 2021-01-03 NOTE — ED PROVIDER NOTE - NS ED ROS FT
Constitutional: nad, well appearing  HEENT:  no nasal congestion, eye drainage or ear pain.    CVS:  +cp  Resp:  No sob, no cough  GI:  no abdominal pain, + nausea and vomiting  :  no dysuria  MSK: no joint pain or limited ROM  Skin: no rash  Neuro: no change in mental status or level of consciousness  Heme/lymph: no bleeding

## 2021-01-03 NOTE — ED PROVIDER NOTE - OBJECTIVE STATEMENT
75 y M pmh of CAD s/p coronary artery stent placement x5, HTN, HLD, DM, PVD s/p 2 stents to LLE, s/p R femoral artery repair, and s/p appendectomy presenting with left sided chest pressure for the last 1.5 hours.  A/w n/v.  Denies sob/cough/fevers.  Not exertional.  Started suddenly at rest.  Was here yesterday for evaluation of calf pain and seen by vascular.  No LE complaints at all today.  Was on heparin while here and has had 1 dose of eliquis as well.  Reports that he feels weak and dizzy and like he may pass out.  Cannot find a position of comfort.  Denies drinking.

## 2021-01-03 NOTE — ED PROVIDER NOTE - PSH
H/O colonoscopy with polypectomy    h/o femoral artery repair  Right femoral artery with blood clots excised after complications from an angioseal at [facility] 2000.  H/O shoulder surgery  right and left  H/O therapeutic radiation  intra arterial after coronary stent placed at [facility] 2003  History of appendectomy    PVD (peripheral vascular disease)  Stents x 2 Left Leg  S/P primary angioplasty with coronary stents x 5

## 2021-01-03 NOTE — H&P ADULT - ATTENDING COMMENTS
75 year old male with narrative as previously stated seen and examined after tele hospitalist evaluation. Bedside physical examination performed. Refer to above for detailed plan

## 2021-01-03 NOTE — ED PROVIDER NOTE - PROGRESS NOTE DETAILS
Labs and imaging unremarkable.  Given cp and significant cardiac history admitted for further w/u and management.  Further care per inpatient treatment team.

## 2021-01-03 NOTE — INPATIENT CERTIFICATION FOR MEDICARE PATIENTS - RISKS OF ADVERSE EVENTS
Concern for cardiopulmonary deterioration/Concern for neurologic deterioration/Concern for renal deterioration

## 2021-01-03 NOTE — ED ADULT NURSE NOTE - NSIMPLEMENTINTERV_GEN_ALL_ED
Implemented All Universal Safety Interventions:  [facility] to call system. Call bell, personal items and telephone within reach. Instruct patient to call for assistance. Room bathroom lighting operational. Non-slip footwear when patient is off stretcher. Physically safe environment: no spills, clutter or unnecessary equipment. Stretcher in lowest position, wheels locked, appropriate side rails in place.

## 2021-01-03 NOTE — ED PROVIDER NOTE - CLINICAL SUMMARY MEDICAL DECISION MAKING FREE TEXT BOX
Pt with cp/dizziness/weakness.  Difficult for patient to describe symptoms.  Given significant cardiac history I was concerned for MI - EKG is completely unchanged from yesterday.  Will trend troponins.  Pt is high risk for this and will likely need admission for acs eval in any event.  Satting well and bradycardic and without sob and anticoagulated thus I do not suspect PE - below threshold for further w/u.  Do not suspect dissection as pain not described as ripping or tearing.  No e/o ptx/pna/carditis.  Bedside sono w/o pericardial effusion or obvious wall motion abnormalities.  Would also consider intracranial process given atypical symptoms and recent initiation of anticoagulation, but patient not focal on exam and without significant GUILLEN.  Will Check CTH.  Dispo pending labs, imaging, and reassessment.  Anticipate admission.

## 2021-01-03 NOTE — ED ADULT NURSE NOTE - OBJECTIVE STATEMENT
pt brought in by ambulance c/o vomiting since awaking and "not feeling right" Pt reports not taking his blood pressure medications. Pt anxious appearing, restless in bed, but oriented to self, place and year. Pt pale and diaphoretic upon arrival to ED, EKG done, vitals as charted, dr sanchez at pt bedside. Cardiac monitoring in progress, EKG done, safety maintained. Pt admits to using marijuana "from a dealer"

## 2021-01-03 NOTE — ED PROVIDER NOTE - PHYSICAL EXAMINATION
Constitutional: uncomfortable appearing  HEENT: no rhinorrhea  CVS:  bradycardic with regular rhythm  Resp:  CTAB  GI: soft, ntnd  MSK:  no restriction to rom, full ROM to all extremities  Neuro:  A&Ox3, 5/5 strength to all extremities,  SILT to all extremities, FNF intact  Skin: no rash  psych: clear thought content  Heme/lymph:  No LAD

## 2021-01-03 NOTE — H&P ADULT - HISTORY OF PRESENT ILLNESS
75 year old male w recent dx arterial thrombosis in RLE now on eliquis, CAD s/p 5 stents, DM, HLD, HTN, PAD/PVD s/pRLE 5 stents and hx of femoral artery repai and LLE 3 stents returned to ED by ambulance c/o L chest pressure, weakness, vomiting     Yesterday was seen in  ED for R calf pain that developed after he went to the gym;  Total occlusion of stents in the right common femoral and proximal-distal femoral arteries. Patent profunda. Reconstitution of monophasic flow in the popliteal and infrapopliteal arteries. Anterior tibial remains occluded.  Seen by vascular at  and placed on eliquis w discharge home. ([place] was not accepting transfers)    Returned by ambulance today for L sided chest pressure x 1.5 hrs as told to ED  Could not find a comfortable position  Vomited greenish yellow material in  ED    In ED /62  HR 54   RR 18  Head CT no acute changes     CT scan     PAST MEDICAL HX  Arterial thrombosis R   Anginal pain    CAD (coronary artery disease)    DM diabetes mellitus    ED (erectile dysfunction)    HLD history of hyperlipidemia    HTN - Hypertension    Hx of gout  last flare-up 1 year ago  PVD (peripheral vascular disease) 3 LLE, RLE 5      PAST SURGICAL HX  R femoral stent 2020  H/O colonoscopy with polypectomy    h/o femoral artery repair  Right femoral artery with blood clots excised after complications from an angioseal at [facility] .  H/O shoulder surgery  right and left  H/O therapeutic radiation  intra arterial after coronary stent placed at [facility]   History of appendectomy    PVD (peripheral vascular disease)  Stents x 2 Left Leg  S/P primary angioplasty with coronary stents x 5      FAMILY HX  Mother  in her sleep at age 56-57  Father w circulation problems      SOCIAL HX  smokes THC think he last smoked yesterday   75 year old male w recent dx arterial thrombosis in RLE now on eliquis, CAD s/p 5 stents, DM II, HLD, HTN, PAD/PVD s/p RLE 5 stents and hx of femoral artery repair and LLE 3 stents returned to ED by ambulance c/o L chest pressure, weakness, vomiting     Yesterday was seen in  ED for R calf pain that developed after he went to the gym;  Total occlusion of stents in the right common femoral and proximal-distal femoral arteries. Patent profunda. Reconstitution of monophasic flow in the popliteal and infrapopliteal arteries. Anterior tibial remains occluded.  Seen by vascular at  and placed on eliquis w discharge home. ([place] was not accepting transfers)    Returned by ambulance today for L sided chest pressure x 1.5 hrs as told to ED  Could not find a comfortable position  Vomited greenish yellow material in  ED  To me he indicated that pain started this AM 6-7/10  not sure what increased or decreased the pain but felt better in the ED after morphine  Last ate this AM 1.5 cups of coffee and yogurt.  Took his metformin after the angio yesterday    In ED /62  HR 54   RR 18  Head CT no acute changes     CT scan abd/pelvis no reason for abd pain  s/p NS  cc  zofran 4 mg IV, morphine 4 mg IV, ativan 1 mg, fentanyl 100 mcg x 1 IV, famotidine 20 mg IV  then lisinopril 20 mg po x1 for  sys    PAST MEDICAL HX  Arterial thrombosis R dx by angio 2020  Anginal pain    CAD (coronary artery disease)    DM diabetes mellitus  II on oral agents and insulin  ED (erectile dysfunction)    HLD history of hyperlipidemia    HTN - Hypertension    Hx of gout  last flare-up 1 year ago  PVD (peripheral vascular disease) 3 LLE, RLE 5      PAST SURGICAL HX  R femoral stent 2020  H/O colonoscopy with polypectomy    h/o femoral artery repair  Right femoral artery with blood clots excised after complications from an angioseal at [facility] .  H/O shoulder surgery  right and left  H/O therapeutic radiation  intra arterial after coronary stent placed at [facility]   History of appendectomy    PVD (peripheral vascular disease)  Stents x 2 Left Leg  S/P primary angioplasty with coronary stents x 5      FAMILY HX  Mother  in her sleep at age 56-57  Father w circulation problems      SOCIAL HX  smokes THC think he last smoked yesterday   75 year old male w recent dx arterial thrombosis in RLE now on eliquis, CAD s/p 5 stents, DM II, HLD, HTN, PAD/PVD s/p RLE 5 stents and hx of femoral artery repair and LLE 3 stents returned to ED by ambulance c/o L chest pressure, weakness, vomiting     Yesterday was seen in  ED for R calf pain that developed after he went to the gym;  Total occlusion of stents in the right common femoral and proximal-distal femoral arteries. Patent profunda. Reconstitution of monophasic flow in the popliteal and infrapopliteal arteries. Anterior tibial remains occluded.  Seen by vascular at  and placed on eliquis w discharge home. ([place] was not accepting transfers)    Returned by ambulance today for L sided chest pressure x 1.5 hrs as told to ED  Could not find a comfortable position  Vomited greenish yellow material in  ED  To me he indicated that pain started this AM 6-7/10  not sure what increased or decreased the pain but felt better in the ED after morphine  Last ate this AM 1.5 cups of coffee and yogurt.  Took his metformin after the angio yesterday    In ED /62  HR 54   RR 18  Head CT no acute changes     CT scan abd/pelvis no reason for abd pain  EKG NSR no acute ST-T changes  s/p NS 2000 cc  zofran 4 mg IV, morphine 4 mg IV, ativan 1 mg, fentanyl 100 mcg x 1 IV, famotidine 20 mg IV  then lisinopril 20 mg po x1 for  sys    PAST MEDICAL HX  Arterial thrombosis R dx by angio 2020  Anginal pain    CAD (coronary artery disease)    DM diabetes mellitus  II on oral agents and insulin  ED (erectile dysfunction)    HLD history of hyperlipidemia    HTN - Hypertension    Hx of gout  last flare-up 1 year ago  PVD (peripheral vascular disease) 3 LLE, RLE 5      PAST SURGICAL HX  R femoral stent 2020  H/O colonoscopy with polypectomy    h/o femoral artery repair  Right femoral artery with blood clots excised after complications from an angioseal at [facility] .  H/O shoulder surgery  right and left  H/O therapeutic radiation  intra arterial after coronary stent placed at [facility]   History of appendectomy    PVD (peripheral vascular disease)  Stents x 2 Left Leg  S/P primary angioplasty with coronary stents x 5      FAMILY HX  Mother  in her sleep at age 56-57  Father w circulation problems      SOCIAL HX  smokes THC think he last smoked yesterday

## 2021-01-03 NOTE — H&P ADULT - ASSESSMENT
75 year old male w recent dx arterial thrombosis in RLE now on eliquis, CAD s/p 5 stents, DM II, HLD, HTN, PAD/PVD s/p RLE 5 stents and hx of femoral artery repair and LLE 3 stents returned to ED by ambulance c/o L chest pressure, weakness, vomiting     #Possible ACS  #L chest pain, N, V  #Abd pain w/o cause seen on CT abd/pelvis  1. admit to telemetry  2. serial trop  3. ekg  4. cardiology consult  5. ECHO  6. bedrest  7. UDS although expecting it to show benzo, opiate      #Metabolic acidosis  continue metformin aftre angio yesterday  3.4 decreased to 1.8  1. s/p NS 2L  2. continue  cc /hr        #Vomiting  1. zofran prn      #Arterial thrombosis to RLE  seen by vascular yesterday and started on eliquis  1. continue eliquis 5 mg q12hrs      #CAD s/p stents  #PAD s/p stents  1. metoprolol 25 mg w parameters  2. diltiazem  mg w parameters  3. asa 81 mg enteric      #HLD  pt unable to tolerate lipitor  1. rosuvastatin not available      #DM   1. diet  2. hold metformin and starlix  3. reduce QHS Lantus from 31 to 20 since pt has not eaten much today  4. Hb A1c          #MISC  1. routine UA  2. since allopurinol is 300 mg q week, hold for now        #VTE  IMPROVE VTE Individual Risk Assessment    RISK                                                                Points    [  ] Previous VTE                                                  3    [ X ] Thrombophilia                                               2    [  ] Lower limb paralysis                                      2        (unable to hold up >15 seconds)      [  ] Current Cancer                                              2         (within 6 months)    [  ] Immobilization > 24 hrs                                1    [  ] ICU/CCU stay > 24 hours                              1    [ X ] Age > 60                                                      1    IMPROVE VTE Score _____3____    IMPROVE Score 0-1: Low Risk, No VTE prophylaxis required for most patients, encourage ambulation.   IMPROVE Score 2-3: At risk, pharmacologic VTE prophylaxis is indicated for most patients (in the absence of a contraindication)  IMPROVE Score > or = 4: High Risk, pharmacologic VTE prophylaxis is indicated for most patients (in the absence of a contraindication)        VTE on eliquis for arterial thrombus x 7 days until follow up w vasc  med rec done w pt using the data on his phone  echo  cardiology consult  inpatient certification        FOLLOW UP  exam  repeat trop at   [facility] 75 year old male w recent dx arterial thrombosis in RLE now on eliquis, CAD s/p 5 stents, DM II, HLD, HTN, PAD/PVD s/p RLE 5 stents and hx of femoral artery repair and LLE 3 stents returned to ED by ambulance c/o L chest pressure, weakness, vomiting     #Possible ACS  #L chest pain, N, V  #Abd pain w/o cause seen on CT abd/pelvis  1. admit to telemetry  2. serial trop  3. ekg  4. cardiology consult  5. ECHO  6. bedrest  7. UDS although expecting it to show benzo, opiate      #Metabolic acidosis  continue metformin aftre angio yesterday  3.4 decreased to 1.8  1. s/p NS 2L  2. continue  cc /hr      #Elevated Cr 1.4  vs 1.32 yesterday  #Vomiting  1. zofran prn  2. s/p NS 2 L  3. IVF  4. trend Cr, P      #Hypercalcemia 10.3 when normal yesterday  1. repeat in AM      #Arterial thrombosis to RLE  seen by vascular yesterday and started on eliquis  1. continue eliquis 5 mg q 12 hrs      #CAD s/p stents  #PAD s/p stents  1. metoprolol 25 mg w parameters  2. diltiazem  mg w parameters  3. asa 81 mg enteric      #HLD  pt unable to tolerate lipitor  1. rosuvastatin not available      #DM   1. diet  2. hold metformin and starlix  3. reduce QHS Lantus from 31 to 20 since pt has not eaten much today  4. Hb A1c          #MISC  1. routine UA  2. since allopurinol is 300 mg q week, hold for now        #VTE  IMPROVE VTE Individual Risk Assessment    RISK                                                                Points    [  ] Previous VTE                                                  3    [ X ] Thrombophilia                                               2    [  ] Lower limb paralysis                                      2        (unable to hold up >15 seconds)      [  ] Current Cancer                                              2         (within 6 months)    [  ] Immobilization > 24 hrs                                1    [  ] ICU/CCU stay > 24 hours                              1    [ X ] Age > 60                                                      1    IMPROVE VTE Score _____3____    IMPROVE Score 0-1: Low Risk, No VTE prophylaxis required for most patients, encourage ambulation.   IMPROVE Score 2-3: At risk, pharmacologic VTE prophylaxis is indicated for most patients (in the absence of a contraindication)  IMPROVE Score > or = 4: High Risk, pharmacologic VTE prophylaxis is indicated for most patients (in the absence of a contraindication)        VTE on eliquis for arterial thrombus x 7 days until follow up w vasc  med rec done w pt using the data on his phone  echo  cardiology consult  inpatient certification        FOLLOW UP  exam  repeat trop at   [facility]

## 2021-01-03 NOTE — ED ADULT TRIAGE NOTE - CHIEF COMPLAINT QUOTE
Pt. to the ED BIBA Vomiting and Not feeling well. Pt. states he was evaluated in ED yesterday and was D/C home. EKG and BGM Stat- Hx. of DM

## 2021-01-04 ENCOUNTER — TRANSCRIPTION ENCOUNTER (OUTPATIENT)
Age: 76
End: 2021-01-04

## 2021-01-04 VITALS — HEART RATE: 64 BPM

## 2021-01-04 DIAGNOSIS — R07.9 CHEST PAIN, UNSPECIFIED: ICD-10-CM

## 2021-01-04 LAB
A1C WITH ESTIMATED AVERAGE GLUCOSE RESULT: 6.5 % — HIGH (ref 4–5.6)
ADD ON TEST-SPECIMEN IN LAB: SIGNIFICANT CHANGE UP
CHOLEST SERPL-MCNC: 136 MG/DL — SIGNIFICANT CHANGE UP
ESTIMATED AVERAGE GLUCOSE: 140 MG/DL — HIGH (ref 68–114)
HDLC SERPL-MCNC: 52 MG/DL — SIGNIFICANT CHANGE UP
LIPID PNL WITH DIRECT LDL SERPL: 65 MG/DL — SIGNIFICANT CHANGE UP
NON HDL CHOLESTEROL: 83 MG/DL — SIGNIFICANT CHANGE UP
SARS-COV-2 IGG SERPL QL IA: NEGATIVE — SIGNIFICANT CHANGE UP
SARS-COV-2 IGM SERPL IA-ACNC: <0.1 INDEX — SIGNIFICANT CHANGE UP
TRIGL SERPL-MCNC: 90 MG/DL — SIGNIFICANT CHANGE UP
TROPONIN I SERPL-MCNC: <0.015 NG/ML — SIGNIFICANT CHANGE UP (ref 0.01–0.04)

## 2021-01-04 PROCEDURE — 99239 HOSP IP/OBS DSCHRG MGMT >30: CPT

## 2021-01-04 PROCEDURE — 93010 ELECTROCARDIOGRAM REPORT: CPT

## 2021-01-04 PROCEDURE — 99223 1ST HOSP IP/OBS HIGH 75: CPT

## 2021-01-04 RX ADMIN — Medication 81 MILLIGRAM(S): at 11:07

## 2021-01-04 RX ADMIN — SODIUM CHLORIDE 100 MILLILITER(S): 9 INJECTION INTRAMUSCULAR; INTRAVENOUS; SUBCUTANEOUS at 00:39

## 2021-01-04 RX ADMIN — Medication 120 MILLIGRAM(S): at 11:07

## 2021-01-04 RX ADMIN — SODIUM CHLORIDE 100 MILLILITER(S): 9 INJECTION INTRAMUSCULAR; INTRAVENOUS; SUBCUTANEOUS at 11:06

## 2021-01-04 RX ADMIN — Medication 25 MILLIGRAM(S): at 11:07

## 2021-01-04 RX ADMIN — APIXABAN 5 MILLIGRAM(S): 2.5 TABLET, FILM COATED ORAL at 11:06

## 2021-01-04 RX ADMIN — LISINOPRIL 20 MILLIGRAM(S): 2.5 TABLET ORAL at 11:07

## 2021-01-04 NOTE — DISCHARGE NOTE PROVIDER - NSDCMRMEDTOKEN_GEN_ALL_CORE_FT
allopurinol 300 mg oral tablet: 1 tab(s) orally every 7 days  apixaban 5 mg oral tablet: 1 tab(s) orally 2 times a day   aspirin 81 mg oral tablet: 1 tab(s) orally once a day  dilTIAZem 120 mg/24 hours oral capsule, extended release: 1 cap(s) orally once a day  Lantus 100 units/mL subcutaneous solution: 32 unit(s) subcutaneous once a day (at bedtime)  metFORMIN 750 mg oral tablet, extended release: 1 tab(s) orally 2 times a day  metoprolol succinate 25 mg oral capsule, extended release: 1 cap(s) orally once a day  ramipril 5 mg oral capsule: 1 cap(s) orally 2 times a day  rosuvastatin 40 mg oral tablet: 1 tab(s) orally once a day  Starlix 60 mg oral tablet: 1 tab(s) orally 2 times a day (before meals)

## 2021-01-04 NOTE — DISCHARGE NOTE PROVIDER - CARE PROVIDER_API CALL
LETTY KHAN  [facility]  [address] [place] [address]  Phone: [phone]  Fax: [phone]  Follow Up Time: 1 week    Dr. Clements (Vascular),   Phone: (   )    -  Fax: (   )    -  Follow Up Time: 1-3 days

## 2021-01-04 NOTE — DISCHARGE NOTE PROVIDER - PROVIDER TOKENS
PROVIDER:[TOKEN:[[id number]],FOLLOWUP:[1 week]],FREE:[LAST:[Dr. Clements (Vascular)],PHONE:[(   )    -],FAX:[(   )    -],FOLLOWUP:[1-3 days]]

## 2021-01-04 NOTE — DISCHARGE NOTE NURSING/CASE MANAGEMENT/SOCIAL WORK - PATIENT PORTAL LINK FT
You can access the FollowMyHealth Patient Portal offered by [facility] by registering at the following website: [email or web address]. By joining [facility]s FollowMyHealth portal, you will also be able to view your health information using other applications (apps) compatible with our system.

## 2021-01-04 NOTE — DISCHARGE NOTE PROVIDER - INSTRUCTIONS
Low Sodium. Low Cholesterol. Low Carbohydrate  Choose foods that are low in salt - examples include: fresh meats, poultry, fish, eggs, milk and yogurt. Avoid packaged/processed foods and excessive table salt use.

## 2021-01-04 NOTE — DISCHARGE NOTE PROVIDER - NSDCCPCAREPLAN_GEN_ALL_CORE_FT
PRINCIPAL DISCHARGE DIAGNOSIS  Diagnosis: Chest pain, unspecified type  Assessment and Plan of Treatment: - You were admitted due to shest pain however pain located LUQ likely GI related. low suspicion for cardiac issue - no further testing was reccomended. Your CT of your abdomen did not reveal any acute issues and was normal.   - Continue to stay hydrated and follow up with your PCP for further management.      SECONDARY DISCHARGE DIAGNOSES  Diagnosis: DM (diabetes mellitus)  Assessment and Plan of Treatment: - Continue your home medications. DO NOT RESUME METFORMIN until 1/5 because you recieved IV contrast.   - Your A1c is 6.5    Diagnosis: Lower extremity pain, right  Assessment and Plan of Treatment: - You were recently seen here at  on 1/2 and was found to have arterial thrombosis to RLE with occlusion of right femoral stent   -  Continue Eliquis, Aspirin and Rouvastatin and Follow up with your vascular Surgeon for endovascular intervention (Dr. Clements).

## 2021-01-04 NOTE — PROGRESS NOTE ADULT - SUBJECTIVE AND OBJECTIVE BOX
CHIEF COMPLAINT/DIAGNOSIS: LT chest pressure, weakness, vomiting    HPI: 75 year old male w recent dx arterial thrombosis in RLE now on eliquis, CAD s/p 5 stents, DM II, HLD, HTN, PAD/PVD s/p RLE 5 stents and hx of femoral artery repair and LLE 3 stents returned to ED by ambulance c/o L chest pressure, weakness, vomiting. Yesterday was seen in  ED for R calf pain that developed after he went to the gym;  Total occlusion of stents in the right common femoral and proximal-distal femoral arteries. Patent profunda. Reconstitution of monophasic flow in the popliteal and infrapopliteal arteries. Anterior tibial remains occluded.  Seen by vascular at  and placed on eliquis w discharge home. ([facility] was not accepting transfers)    Returned by ambulance today for L sided chest pressure x 1.5 hrs as told to ED  Could not find a comfortable position  Vomited greenish yellow material in  ED  To me he indicated that pain started this AM 6-7/10  not sure what increased or decreased the pain but felt better in the ED after morphine  Last ate this AM 1.5 cups of coffee and yogurt.  Took his metformin after the angio yesterday    1/4:   REVIEW OF SYSTEMS:  All other review of systems is negative unless indicated above    PHYSICAL EXAM:  Constitutional: NAD, awake and alert, well-developed  HEENT: PERR, EOMI, Normal Hearing, MMM  Neck: Soft and supple, No LAD, No JVD  Respiratory: Breath sounds are clear bilaterally, No wheezing, rales or rhonchi  Cardiovascular: S1 and S2, regular rate and rhythm, no Murmurs, gallops or rubs  Gastrointestinal: Bowel Sounds present, soft, nontender, nondistended, no guarding, no rebound  Extremities: No peripheral edema  Vascular: 2+ peripheral pulses  Neurological: A/O x 3, no focal deficits  Musculoskeletal: 5/5 strength b/l upper and lower extremities  Skin: No rashes    Vital Signs Last 24 Hrs  T(C): 36.7 (04 Jan 2021 09:11), Max: 37.1 (04 Jan 2021 00:22)  T(F): 98.1 (04 Jan 2021 09:11), Max: 98.8 (04 Jan 2021 00:22)  HR: 56 (04 Jan 2021 09:11) (46 - 82)  BP: 147/53 (04 Jan 2021 09:11) (125/62 - 194/67)  BP(mean): --  RR: 18 (04 Jan 2021 09:11) (15 - 20)  SpO2: 100% (04 Jan 2021 09:11) (95% - 100%)    LABS: All Labs Reviewed:                        11.3   8.29  )-----------( 368      ( 03 Jan 2021 22:37 )             35.9     01-03    140  |  108  |  19  ----------------------------<  101<H>  4.2   |  27  |  1.17    Ca    9.0      03 Jan 2021 22:37  Phos  4.2     01-03  Mg     1.8     01-03    TPro  8.6<H>  /  Alb  4.4  /  TBili  0.5  /  DBili  x   /  AST  22  /  ALT  22  /  AlkPhos  104  01-03    PT/INR - ( 03 Jan 2021 14:57 )   PT: 12.2 sec;   INR: 1.05 ratio         PTT - ( 03 Jan 2021 14:57 )  PTT:35.1 sec  CARDIAC MARKERS ( 04 Jan 2021 06:03 )  <0.015 ng/mL / x     / x     / x     / x      CARDIAC MARKERS ( 03 Jan 2021 22:37 )  <0.015 ng/mL / x     / x     / x     / x      CARDIAC MARKERS ( 03 Jan 2021 14:57 )  <0.015 ng/mL / x     / x     / x     / x        RADIOLOGY:  < from: CT Abdomen and Pelvis w/ IV Cont (01.03.21 @ 16:10) >  IMPRESSION:  No acute findings on CT the abdomen and pelvis to explain patient's clinical symptoms.  < end of copied text >    < from: CT Head No Cont (01.03.21 @ 15:57) >  IMPRESSION:  Volume loss withmicrovascular disease, no acute hemorrhage or midline shift. If symptoms persist consider follow-up head CT or MR if no contraindications.  < end of copied text >    < from: Xray Chest 1 View- PORTABLE-Urgent (01.03.21 @ 15:20) >  IMPRESSION: The heart is normal in size. The lungs are clear. The patient is status post median sternotomy.  < end of copied text >    < from: CT Angio Abd Aorta w/run-off w/ IV Cont (01.02.21 @ 17:47) >  IMPRESSION:  AORTOILIAC INFLOW: No significant inflow disease.  RIGHT LEG: Complete in-stent occlusion of the SFA from proximal to distal. Popliteal reconstituted by a patent profunda. Two-vessel runoff to the foot via the PT and peroneal. AT shows multifocal occlusions. DP is severely attenuated but patent. Plantar is patent.  LEFT LEG: Patent femoral-popliteal runoff with patent SFA stent from proximal to distal. Two-vessel runoff to the foot via the PT and peroneal. AT shows multifocal occlusions. DP is attenuated but patent. Plantar is patent.  < end of copied text >    < from: US Duplex Arterial Lower Ext Ltd, Right (01.02.21 @ 14:09) >  IMPRESSION:  Total occlusion of stents in theright common femoral and proximal-distal femoral arteries. Patent profunda. Reconstitution of monophasic flow in the popliteal and infrapopliteal arteries. Anterior tibial remains occluded.  < end of copied text >    ECHOCARDIOGRAM: Pending     MEDICATIONS  (STANDING):  apixaban 5 milliGRAM(s) Oral every 12 hours  aspirin enteric coated 81 milliGRAM(s) Oral daily  dextrose 40% Gel 15 Gram(s) Oral once  dextrose 5%. 1000 milliLiter(s) (50 mL/Hr) IV Continuous <Continuous>  dextrose 5%. 1000 milliLiter(s) (100 mL/Hr) IV Continuous <Continuous>  dextrose 50% Injectable 25 Gram(s) IV Push once  dextrose 50% Injectable 12.5 Gram(s) IV Push once  dextrose 50% Injectable 25 Gram(s) IV Push once  diltiazem    milliGRAM(s) Oral daily  glucagon  Injectable 1 milliGRAM(s) IntraMuscular once  insulin glargine Injectable (LANTUS) 20 Unit(s) SubCutaneous at bedtime  insulin lispro (ADMELOG) corrective regimen sliding scale   SubCutaneous three times a day before meals  insulin lispro (ADMELOG) corrective regimen sliding scale   SubCutaneous at bedtime  lisinopril 20 milliGRAM(s) Oral two times a day  metoprolol succinate ER 25 milliGRAM(s) Oral daily  sodium chloride 0.9%. 1000 milliLiter(s) (100 mL/Hr) IV Continuous <Continuous>    MEDICATIONS  (PRN):  acetaminophen   Tablet .. 650 milliGRAM(s) Oral every 6 hours PRN Temp greater or equal to 38C (100.4F), Mild Pain (1 - 3)  morphine  - Injectable 2 milliGRAM(s) IV Push every 5 minutes PRN chest pain    TELEMETRY REVIEW:  1/4:    ASSESSMENT AND PLAN:    75 year old male w recent dx arterial thrombosis in RLE now on eliquis, CAD s/p 5 stents, DM II, HLD, HTN, PAD/PVD s/p RLE 5 stents and hx of femoral artery repair and LLE 3 stents returned to ED by ambulance c/o L chest pressure, weakness, vomiting     R/o ACS  - tele monitoring. tele review as above.   - Trops neg x3, ECG sinus bradycardia - no significant change from prior  - f/u echo   - Cont. ASA, not on statin - patient unable to tolerate Lipitor (on rosuvastatin outpatient)  - Cont. BB    - Cardio consult         7. UDS although expecting it to show benzo, opiate      ASH   - suspected pre-renal   - resolved, s/p IVF     Hypercalcemia - resolved, s/p IVF    Arterial thrombosis to RLE with occlusion of right femoral stent   - Seen by vascular on 1/2 and started on Eliquis  - Cont. Eliquis BID  - patient to f/u w/ Vascular Surgeon for endovascular intervention       Hx CAD s/p stents | PAD s/p stents | HLD  - Cont. ASA, Eliquis  - On Rosuvastatin outpatient ~ not able to tolerate Lipitor           1. metoprolol 25 mg w parameters  2. diltiazem  mg w parameters  3. asa 81 mg enteric          #DM   1. diet  2. hold metformin and starlix  3. reduce QHS Lantus from 31 to 20 since pt has not eaten much today  4. Hb A1c          #MISC  1. routine UA  2. since allopurinol is 300 mg q week, hold for now           CHIEF COMPLAINT/DIAGNOSIS: LT chest pressure, weakness, vomiting    HPI: 75 year old male w recent dx arterial thrombosis in RLE now on eliquis, CAD s/p 5 stents, DM II, HLD, HTN, PAD/PVD s/p RLE 5 stents and hx of femoral artery repair and LLE 3 stents returned to ED by ambulance c/o L chest pressure, weakness, vomiting. Yesterday was seen in  ED for R calf pain that developed after he went to the gym;  Total occlusion of stents in the right common femoral and proximal-distal femoral arteries. Patent profunda. Reconstitution of monophasic flow in the popliteal and infrapopliteal arteries. Anterior tibial remains occluded.  Seen by vascular at  and placed on eliquis w discharge home. ([facility] was not accepting transfers)    Returned by ambulance today for L sided chest pressure x 1.5 hrs as told to ED  Could not find a comfortable position  Vomited greenish yellow material in  ED  To me he indicated that pain started this AM 6-7/10  not sure what increased or decreased the pain but felt better in the ED after morphine  Last ate this AM 1.5 cups of coffee and yogurt.  Took his metformin after the angio yesterday    1/4: denies cp, palp or sob. patient reports pain originated in LUQ area, not chest area. pain since has resolved. oob ambulating w/o symptoms. reports w/ long ambulation left calf pain. Due to follow up with Dr. Starr hernandez Dr. this week.   REVIEW OF SYSTEMS:  All other review of systems is negative unless indicated above    PHYSICAL EXAM:  Constitutional: Awake and alert, well-developed  HEENT: Normal Hearing, MMM  Neck: Soft and supple  Respiratory: Breath sounds are clear bilaterally, No wheezing, rales or rhonchi  Cardiovascular: S1 and S2, regular rate and rhythm, no Murmurs, gallops or rubs  Gastrointestinal: Bowel Sounds present, soft, nontender, nondistended, no guarding, no rebound  Extremities: No peripheral edema  Vascular: 2+ peripheral pulses  Neurological: A/O x 3, no focal deficits  Musculoskeletal: 5/5 strength b/l upper and lower extremities  Skin: No rashes    Vital Signs Last 24 Hrs  T(C): 36.7 (04 Jan 2021 09:11), Max: 37.1 (04 Jan 2021 00:22)  T(F): 98.1 (04 Jan 2021 09:11), Max: 98.8 (04 Jan 2021 00:22)  HR: 56 (04 Jan 2021 09:11) (46 - 82)  BP: 147/53 (04 Jan 2021 09:11) (125/62 - 194/67)  BP(mean): --  RR: 18 (04 Jan 2021 09:11) (15 - 20)  SpO2: 100% (04 Jan 2021 09:11) (95% - 100%) -- room air    LABS: All Labs Reviewed:                        11.3   8.29  )-----------( 368      ( 03 Jan 2021 22:37 )             35.9     01-03    140  |  108  |  19  ----------------------------<  101<H>  4.2   |  27  |  1.17    Ca    9.0      03 Jan 2021 22:37  Phos  4.2     01-03  Mg     1.8     01-03    TPro  8.6<H>  /  Alb  4.4  /  TBili  0.5  /  DBili  x   /  AST  22  /  ALT  22  /  AlkPhos  104  01-03    PT/INR - ( 03 Jan 2021 14:57 )   PT: 12.2 sec;   INR: 1.05 ratio         PTT - ( 03 Jan 2021 14:57 )  PTT:35.1 sec  CARDIAC MARKERS ( 04 Jan 2021 06:03 )  <0.015 ng/mL / x     / x     / x     / x      CARDIAC MARKERS ( 03 Jan 2021 22:37 )  <0.015 ng/mL / x     / x     / x     / x      CARDIAC MARKERS ( 03 Jan 2021 14:57 )  <0.015 ng/mL / x     / x     / x     / x        RADIOLOGY:  < from: CT Abdomen and Pelvis w/ IV Cont (01.03.21 @ 16:10) >  IMPRESSION:  No acute findings on CT the abdomen and pelvis to explain patient's clinical symptoms.  < end of copied text >    < from: CT Head No Cont (01.03.21 @ 15:57) >  IMPRESSION:  Volume loss with microvascular disease, no acute hemorrhage or midline shift. If symptoms persist consider follow-up head CT or MR if no contraindications.  < end of copied text >    < from: Xray Chest 1 View- PORTABLE-Urgent (01.03.21 @ 15:20) >  IMPRESSION: The heart is normal in size. The lungs are clear. The patient is status post median sternotomy.  < end of copied text >    < from: CT Angio Abd Aorta w/run-off w/ IV Cont (01.02.21 @ 17:47) >  IMPRESSION:  AORTOILIAC INFLOW: No significant inflow disease.  RIGHT LEG: Complete in-stent occlusion of the SFA from proximal to distal. Popliteal reconstituted by a patent profunda. Two-vessel runoff to the foot via the PT and peroneal. AT shows multifocal occlusions. DP is severely attenuated but patent. Plantar is patent.  LEFT LEG: Patent femoral-popliteal runoff with patent SFA stent from proximal to distal. Two-vessel runoff to the foot via the PT and peroneal. AT shows multifocal occlusions. DP is attenuated but patent. Plantar is patent.  < end of copied text >    < from: US Duplex Arterial Lower Ext Ltd, Right (01.02.21 @ 14:09) >  IMPRESSION:  Total occlusion of stents in the right common femoral and proximal-distal femoral arteries. Patent profunda. Reconstitution of monophasic flow in the popliteal and infrapopliteal arteries. Anterior tibial remains occluded.  < end of copied text >    MEDICATIONS  (STANDING):  apixaban 5 milliGRAM(s) Oral every 12 hours  aspirin enteric coated 81 milliGRAM(s) Oral daily  dextrose 40% Gel 15 Gram(s) Oral once  dextrose 5%. 1000 milliLiter(s) (50 mL/Hr) IV Continuous <Continuous>  dextrose 5%. 1000 milliLiter(s) (100 mL/Hr) IV Continuous <Continuous>  dextrose 50% Injectable 25 Gram(s) IV Push once  dextrose 50% Injectable 12.5 Gram(s) IV Push once  dextrose 50% Injectable 25 Gram(s) IV Push once  diltiazem    milliGRAM(s) Oral daily  glucagon  Injectable 1 milliGRAM(s) IntraMuscular once  insulin glargine Injectable (LANTUS) 20 Unit(s) SubCutaneous at bedtime  insulin lispro (ADMELOG) corrective regimen sliding scale   SubCutaneous three times a day before meals  insulin lispro (ADMELOG) corrective regimen sliding scale   SubCutaneous at bedtime  lisinopril 20 milliGRAM(s) Oral two times a day  metoprolol succinate ER 25 milliGRAM(s) Oral daily  sodium chloride 0.9%. 1000 milliLiter(s) (100 mL/Hr) IV Continuous <Continuous>    MEDICATIONS  (PRN):  acetaminophen   Tablet .. 650 milliGRAM(s) Oral every 6 hours PRN Temp greater or equal to 38C (100.4F), Mild Pain (1 - 3)  morphine  - Injectable 2 milliGRAM(s) IV Push every 5 minutes PRN chest pain    TELEMETRY REVIEW:  1/4: sinus 50-60s. HR 30s during sleep hours.    ASSESSMENT AND PLAN:    75 year old male w recent dx arterial thrombosis in RLE now on eliquis, CAD s/p 5 stents, DM II, HLD, HTN, PAD/PVD s/p RLE 5 stents and hx of femoral artery repair and LLE 3 stents returned to ED by ambulance c/o L chest pressure, weakness, vomiting     1) Chest Pain - r/o ACS  Pain located LUQ likely GI related. low suspicion for ACS  - tele monitoring. tele review as above.   - Trops neg x3, ECG sinus bradycardia - no significant change from prior   - Cont. ASA, not on statin - patient unable to tolerate Lipitor (on rosuvastatin outpatient)  - Cont. BB  - Cardio consult appreciated - no further testing     2) ASH, suspected pre-renal   - resolved, s/p IVF  - UDS w/ THC, opiates     3) Hypercalcemia - resolved, s/p IVF    4) Arterial thrombosis to RLE with occlusion of right femoral stent   - Seen by vascular on 1/2 and started on Eliquis  - Cont. Eliquis BID  - patient to f/u w/ Vascular Surgeon for endovascular intervention (Dr. Clements)    5) Hx CAD s/p stents | PAD s/p stents | HLD  - Cont. ASA, Eliquis  - On Rosuvastatin outpatient ~ not able to tolerate Lipitor     6) DM2  - Hold metformin and starlix  - a1c 6.5  - cont. ISS, Lantus     7) DVT ppx  - Eliquis    D/c home. f/u Vascular, pcp outpatient.                      CHIEF COMPLAINT/DIAGNOSIS: LT chest pressure, weakness, vomiting    HPI: 75 year old male w recent dx arterial thrombosis in RLE now on eliquis, CAD s/p 5 stents, DM II, HLD, HTN, PAD/PVD s/p RLE 5 stents and hx of femoral artery repair and LLE 3 stents returned to ED by ambulance c/o L chest pressure, weakness, vomiting. Yesterday was seen in  ED for R calf pain that developed after he went to the gym;  Total occlusion of stents in the right common femoral and proximal-distal femoral arteries. Patent profunda. Reconstitution of monophasic flow in the popliteal and infrapopliteal arteries. Anterior tibial remains occluded.  Seen by vascular at  and placed on eliquis w discharge home. ([facility] was not accepting transfers)    Returned by ambulance today for L sided chest pressure x 1.5 hrs as told to ED  Could not find a comfortable position  Vomited greenish yellow material in  ED  To me he indicated that pain started this AM 6-7/10  not sure what increased or decreased the pain but felt better in the ED after morphine  Last ate this AM 1.5 cups of coffee and yogurt.  Took his metformin after the angio yesterday    1/4: denies cp, palp or sob. patient reports pain originated in LUQ area, not chest area. pain since has resolved. oob ambulating w/o symptoms. reports w/ long ambulation left calf pain. Due to follow up with Dr. Starr hernandez Dr. this week.   REVIEW OF SYSTEMS:  All other review of systems is negative unless indicated above    PHYSICAL EXAM:  Constitutional: Awake and alert, well-developed  HEENT: Normal Hearing, MMM  Neck: Soft and supple  Respiratory: Breath sounds are clear bilaterally, No wheezing, rales or rhonchi  Cardiovascular: S1 and S2, regular rate and rhythm, no Murmurs, gallops or rubs  Gastrointestinal: Bowel Sounds present, soft, nontender, nondistended, no guarding, no rebound  Extremities: No peripheral edema  Vascular: 2+ peripheral pulses  Neurological: A/O x 3, no focal deficits  Musculoskeletal: 5/5 strength b/l upper and lower extremities  Skin: No rashes    Vital Signs Last 24 Hrs  T(C): 36.7 (04 Jan 2021 09:11), Max: 37.1 (04 Jan 2021 00:22)  T(F): 98.1 (04 Jan 2021 09:11), Max: 98.8 (04 Jan 2021 00:22)  HR: 56 (04 Jan 2021 09:11) (46 - 82)  BP: 147/53 (04 Jan 2021 09:11) (125/62 - 194/67)  BP(mean): --  RR: 18 (04 Jan 2021 09:11) (15 - 20)  SpO2: 100% (04 Jan 2021 09:11) (95% - 100%) -- room air    LABS: All Labs Reviewed:                        11.3   8.29  )-----------( 368      ( 03 Jan 2021 22:37 )             35.9     01-03    140  |  108  |  19  ----------------------------<  101<H>  4.2   |  27  |  1.17    Ca    9.0      03 Jan 2021 22:37  Phos  4.2     01-03  Mg     1.8     01-03    TPro  8.6<H>  /  Alb  4.4  /  TBili  0.5  /  DBili  x   /  AST  22  /  ALT  22  /  AlkPhos  104  01-03    PT/INR - ( 03 Jan 2021 14:57 )   PT: 12.2 sec;   INR: 1.05 ratio         PTT - ( 03 Jan 2021 14:57 )  PTT:35.1 sec  CARDIAC MARKERS ( 04 Jan 2021 06:03 )  <0.015 ng/mL / x     / x     / x     / x      CARDIAC MARKERS ( 03 Jan 2021 22:37 )  <0.015 ng/mL / x     / x     / x     / x      CARDIAC MARKERS ( 03 Jan 2021 14:57 )  <0.015 ng/mL / x     / x     / x     / x        RADIOLOGY:  < from: CT Abdomen and Pelvis w/ IV Cont (01.03.21 @ 16:10) >  IMPRESSION:  No acute findings on CT the abdomen and pelvis to explain patient's clinical symptoms.  < end of copied text >    < from: CT Head No Cont (01.03.21 @ 15:57) >  IMPRESSION:  Volume loss with microvascular disease, no acute hemorrhage or midline shift. If symptoms persist consider follow-up head CT or MR if no contraindications.  < end of copied text >    < from: Xray Chest 1 View- PORTABLE-Urgent (01.03.21 @ 15:20) >  IMPRESSION: The heart is normal in size. The lungs are clear. The patient is status post median sternotomy.  < end of copied text >    < from: CT Angio Abd Aorta w/run-off w/ IV Cont (01.02.21 @ 17:47) >  IMPRESSION:  AORTOILIAC INFLOW: No significant inflow disease.  RIGHT LEG: Complete in-stent occlusion of the SFA from proximal to distal. Popliteal reconstituted by a patent profunda. Two-vessel runoff to the foot via the PT and peroneal. AT shows multifocal occlusions. DP is severely attenuated but patent. Plantar is patent.  LEFT LEG: Patent femoral-popliteal runoff with patent SFA stent from proximal to distal. Two-vessel runoff to the foot via the PT and peroneal. AT shows multifocal occlusions. DP is attenuated but patent. Plantar is patent.  < end of copied text >    < from: US Duplex Arterial Lower Ext Ltd, Right (01.02.21 @ 14:09) >  IMPRESSION:  Total occlusion of stents in the right common femoral and proximal-distal femoral arteries. Patent profunda. Reconstitution of monophasic flow in the popliteal and infrapopliteal arteries. Anterior tibial remains occluded.  < end of copied text >    MEDICATIONS  (STANDING):  apixaban 5 milliGRAM(s) Oral every 12 hours  aspirin enteric coated 81 milliGRAM(s) Oral daily  dextrose 40% Gel 15 Gram(s) Oral once  dextrose 5%. 1000 milliLiter(s) (50 mL/Hr) IV Continuous <Continuous>  dextrose 5%. 1000 milliLiter(s) (100 mL/Hr) IV Continuous <Continuous>  dextrose 50% Injectable 25 Gram(s) IV Push once  dextrose 50% Injectable 12.5 Gram(s) IV Push once  dextrose 50% Injectable 25 Gram(s) IV Push once  diltiazem    milliGRAM(s) Oral daily  glucagon  Injectable 1 milliGRAM(s) IntraMuscular once  insulin glargine Injectable (LANTUS) 20 Unit(s) SubCutaneous at bedtime  insulin lispro (ADMELOG) corrective regimen sliding scale   SubCutaneous three times a day before meals  insulin lispro (ADMELOG) corrective regimen sliding scale   SubCutaneous at bedtime  lisinopril 20 milliGRAM(s) Oral two times a day  metoprolol succinate ER 25 milliGRAM(s) Oral daily  sodium chloride 0.9%. 1000 milliLiter(s) (100 mL/Hr) IV Continuous <Continuous>    MEDICATIONS  (PRN):  acetaminophen   Tablet .. 650 milliGRAM(s) Oral every 6 hours PRN Temp greater or equal to 38C (100.4F), Mild Pain (1 - 3)  morphine  - Injectable 2 milliGRAM(s) IV Push every 5 minutes PRN chest pain    TELEMETRY REVIEW:  1/4: sinus 50-60s. HR 30s during sleep hours.    ASSESSMENT AND PLAN:    75 year old male w recent dx arterial thrombosis in RLE now on eliquis, CAD s/p 5 stents, DM II, HLD, HTN, PAD/PVD s/p RLE 5 stents and hx of femoral artery repair and LLE 3 stents returned to ED by ambulance c/o L chest pressure, weakness, vomiting     1) Chest Pain - r/o ACS  Pain located LUQ likely GI related. low suspicion for ACS  - tele monitoring. tele review as above.   - Trops neg x3, ECG sinus bradycardia - no significant change from prior   - Cont. ASA, not on statin - patient unable to tolerate Lipitor (on rosuvastatin outpatient)  - Cont. BB  - lipase, cpk wnl, CTA w/o acute pathology.  - Cardio consult appreciated - no further testing     2) ASH, suspected pre-renal   - resolved, s/p IVF  - UDS w/ THC, opiates     3) Hypercalcemia - resolved, s/p IVF    4) Arterial thrombosis to RLE with occlusion of right femoral stent   - Seen by vascular on 1/2 and started on Eliquis  - Cont. Eliquis BID  - patient to f/u w/ Vascular Surgeon for endovascular intervention (Dr. Clements)    5) Hx CAD s/p stents | PAD s/p stents | HLD  - Cont. ASA, Eliquis  - On Rosuvastatin outpatient ~ not able to tolerate Lipitor     6) DM2  - Hold metformin and starlix  - a1c 6.5  - cont. ISS, Lantus     7) DVT ppx  - Eliquis    D/c home. f/u Vascular, pcp outpatient.

## 2021-01-04 NOTE — CONSULT NOTE ADULT - SUBJECTIVE AND OBJECTIVE BOX
PCP:    REQUESTING PHYSICIAN:    REASON FOR CONSULT:    CHIEF COMPLAINT:    HPI:  75 year old male w recent dx arterial thrombosis in RLE now on eliquis, CAD s/p 5 stents, DM II, HLD, HTN, PAD/PVD s/p RLE 5 stents and hx of femoral artery repair and LLE 3 stents returned to ED by ambulance c/o L chest pressure, weakness, vomiting     Yesterday was seen in  ED for R calf pain that developed after he went to the gym;  Total occlusion of stents in the right common femoral and proximal-distal femoral arteries. Patent profunda. Reconstitution of monophasic flow in the popliteal and infrapopliteal arteries. Anterior tibial remains occluded.  Seen by vascular at  and placed on eliquis w discharge home. ([facility] was not accepting transfers)    Returned by ambulance today for L sided chest pressure x 1.5 hrs as told to ED  Could not find a comfortable position  Vomited greenish yellow material in  ED  To me he indicated that pain started this AM 6-7/10  not sure what increased or decreased the pain but felt better in the ED after morphine  Last ate this AM 1.5 cups of coffee and yogurt.  Took his metformin after the angio yesterday    In ED /62  HR 54   RR 18  Head CT no acute changes     CT scan abd/pelvis no reason for abd pain  EKG NSR no acute ST-T changes  s/p NS 2000 cc  zofran 4 mg IV, morphine 4 mg IV, ativan 1 mg, fentanyl 100 mcg x 1 IV, famotidine 20 mg IV  then lisinopril 20 mg po x1 for  sys    Cardiology consulted for chest pain.  Noncardiac  lower abdoment no chest diffierent than prior anginal pain  not related to exertion or relieved w/ rest.      PAST MEDICAL AND SURGICAL HISTORY:  Anginal pain  ED (erectile dysfunction)  Diabetes mellitus  PVD (peripheral vascular disease)  CAD (coronary artery disease)  HTN - Hypertension  History of hyperlipidemia  Hx of gout  last flare-up 1 year ago  H/O therapeutic radiation  intra arterial after coronary stent placed at [facility]   h/o femoral artery repair  Right femoral artery with blood clots excised after complications from an angioseal at [facility] .  H/O colonoscopy with polypectomy  H/O shoulder surgery  right and left  History of appendectomy  PVD (peripheral vascular disease)  Stents x 2 Left Leg  S/P primary angioplasty with coronary stents x 5        ALLERGIES:  No Known Allergies  Intolerances  Lipitor (Muscle Pain)    SOCIAL HISTORY:  smokes THC think he last smoked yesterday    FAMILY  HISTORY:  Mother  in her sleep at age 56-57  Father w circulation problems      MEDICATIONS:  OUTPATIENT:  Home Medications:  allopurinol 300 mg oral tablet: 1 tab(s) orally every 7 days (2021 18:23)  aspirin 81 mg oral tablet: 1 tab(s) orally once a day (2021 18:23)  dilTIAZem 120 mg/24 hours oral capsule, extended release: 1 cap(s) orally once a day (2021 18:23)  Lantus 100 units/mL subcutaneous solution: 32 unit(s) subcutaneous once a day (at bedtime) (2021 18:23)  metFORMIN 750 mg oral tablet, extended release: 1 tab(s) orally 2 times a day (2021 18:23)  metoprolol succinate 25 mg oral capsule, extended release: 1 cap(s) orally once a day (2021 18:23)  ramipril 5 mg oral capsule: 1 cap(s) orally 2 times a day (2021 18:23)  rosuvastatin 40 mg oral tablet: 1 tab(s) orally once a day (2021 18:23)  Starlix 60 mg oral tablet: 1 tab(s) orally 2 times a day (before meals) (2021 18:23)        INPATIENT:  MEDICATIONS  (STANDING):  apixaban 5 milliGRAM(s) Oral every 12 hours  aspirin enteric coated 81 milliGRAM(s) Oral daily  dextrose 40% Gel 15 Gram(s) Oral once  dextrose 5%. 1000 milliLiter(s) (50 mL/Hr) IV Continuous <Continuous>  dextrose 5%. 1000 milliLiter(s) (100 mL/Hr) IV Continuous <Continuous>  dextrose 50% Injectable 25 Gram(s) IV Push once  dextrose 50% Injectable 12.5 Gram(s) IV Push once  dextrose 50% Injectable 25 Gram(s) IV Push once  diltiazem    milliGRAM(s) Oral daily  glucagon  Injectable 1 milliGRAM(s) IntraMuscular once  insulin glargine Injectable (LANTUS) 20 Unit(s) SubCutaneous at bedtime  insulin lispro (ADMELOG) corrective regimen sliding scale   SubCutaneous three times a day before meals  insulin lispro (ADMELOG) corrective regimen sliding scale   SubCutaneous at bedtime  lisinopril 20 milliGRAM(s) Oral two times a day  metoprolol succinate ER 25 milliGRAM(s) Oral daily    MEDICATIONS  (PRN):  acetaminophen   Tablet .. 650 milliGRAM(s) Oral every 6 hours PRN Temp greater or equal to 38C (100.4F), Mild Pain (1 - 3)  morphine  - Injectable 2 milliGRAM(s) IV Push every 5 minutes PRN chest pain    MEDICATIONS  (PRN):  acetaminophen   Tablet .. 650 milliGRAM(s) Oral every 6 hours PRN Temp greater or equal to 38C (100.4F), Mild Pain (1 - 3)  morphine  - Injectable 2 milliGRAM(s) IV Push every 5 minutes PRN chest pain    REVIEW OF SYSTEMS:    CONSTITUTIONAL: No weakness, fevers or chills  EYES/ENT: No visual changes;  No vertigo or throat pain   NECK: No pain or stiffness  RESPIRATORY: No cough, wheezing, hemoptysis; No shortness of breath  CARDIOVASCULAR: No chest pain or palpitations  GASTROINTESTINAL: No abdominal or epigastric pain. No nausea, vomiting, or hematemesis; No diarrhea or constipation. No melena or hematochezia.  GENITOURINARY: No dysuria, frequency or hematuria  NEUROLOGICAL: No numbness or weakness  SKIN: No itching, burning, rashes, or lesions   All other review of systems is negative unless indicated above    Vital Signs Last 24 Hrs  T(C): 36.7 (2021 09:11), Max: 37.1 (2021 00:22)  T(F): 98.1 (2021 09:11), Max: 98.8 (2021 00:22)  HR: 64 (2021 11:12) (46 - 82)  BP: 147/53 (2021 09:11) (131/58 - 194/67)  BP(mean): --  RR: 18 (2021 09:11) (16 - 20)  SpO2: 100% (2021 09:11) (97% - 100%)      PHYSICAL EXAM:    Constitutional: NAD, awake and alert, well-developed  HEENT: PERR, EOMI,  No oral cyananosis.  Neck:  supple,  No JVD  Respiratory: Breath sounds are clear bilaterally, No wheezing, rales or rhonchi  Cardiovascular: S1 and S2, regular rate and rhythm, no Murmurs, gallops or rubs  Gastrointestinal: Bowel Sounds present, soft, nontender.   Extremities: No peripheral edema. No clubbing or cyanosis.  Vascular: 2+ peripheral pulses  Neurological: A/O x 3, no focal deficits      LABS: All Labs Reviewed:                        11.3   8.29  )-----------( 368      ( 2021 22:37 )             35.9                         12.9   11.97 )-----------( 430      ( 2021 14:57 )             38.7                         11.6   7.67  )-----------( 356      ( 2021 15:08 )             35.5     2021 22:37    140    |  108    |  19     ----------------------------<  101    4.2     |  27     |  1.17   2021 14:57    139    |  106    |  21     ----------------------------<  98     4.0     |  23     |  1.41   2021 15:08    140    |  108    |  27     ----------------------------<  58     4.5     |  30     |  1.32     Ca    9.0        2021 22:37  Ca    10.3       2021 14:57  Ca    9.6        2021 15:08  Phos  4.2       2021 22:37  Mg     1.8       2021 22:37    TPro  8.6    /  Alb  4.4    /  TBili  0.5    /  DBili  x      /  AST  22     /  ALT  22     /  AlkPhos  104    2021 14:57  TPro  8.1    /  Alb  4.0    /  TBili  0.3    /  DBili  x      /  AST  22     /  ALT  21     /  AlkPhos  102    2021 15:08    PT/INR - ( 2021 14:57 )   PT: 12.2 sec;   INR: 1.05 ratio         PTT - ( 2021 14:57 )  PTT:35.1 sec  CARDIAC MARKERS ( 2021 06:03 )  <0.015 ng/mL / x     / 177 U/L / x     / x      CARDIAC MARKERS ( 2021 22:37 )  <0.015 ng/mL / x     / x     / x     / x      CARDIAC MARKERS ( 2021 14:57 )  <0.015 ng/mL / x     / x     / x     / x          Blood Culture:    @ 14:57  Pro Bnp 309     @ 22:37  TSH: 0.70    ===============================  EKG: NSR RBBB, no ishemic changes      TELE:no arrhythmias  ===============================

## 2021-01-04 NOTE — CONSULT NOTE ADULT - ATTENDING COMMENTS
Lorenzo Daily M.D.  Cardiology, [facility]  Cell: [phone]  Office:   [phone] ([facility] Office)  [phone] ([facility] Office)

## 2021-01-04 NOTE — ED ADULT NURSE REASSESSMENT NOTE - NS ED NURSE REASSESS COMMENT FT1
Assumed care of pt from Yoan AGARWAL. pt resting comfortably at this time and in NAD. Pt A+Ox3, sleeping but easily arousable. Denies abdominal pain at this time. Call bell within reach. Will continue to monitor.
pt restless in bed, wretching and unable to tolerate PO at this time. Pt evaluated by rosie Lux ordered. Pt states, "I feel better now" after medications
report given to Viet AGARWAL
Assumed care of patient from ANY Flores at 0030. Patient AxOx4. Patient resting comfortably in bed, denies pain at this time. Patient in no acute signs of distress. All needs addressed at this time. Safety and comfort maintained. Will continue to monitor.

## 2021-01-14 DIAGNOSIS — E11.51 TYPE 2 DIABETES MELLITUS WITH DIABETIC PERIPHERAL ANGIOPATHY WITHOUT GANGRENE: ICD-10-CM

## 2021-01-14 DIAGNOSIS — R00.1 BRADYCARDIA, UNSPECIFIED: ICD-10-CM

## 2021-01-14 DIAGNOSIS — Z79.4 LONG TERM (CURRENT) USE OF INSULIN: ICD-10-CM

## 2021-01-14 DIAGNOSIS — R07.89 OTHER CHEST PAIN: ICD-10-CM

## 2021-01-14 DIAGNOSIS — Z79.01 LONG TERM (CURRENT) USE OF ANTICOAGULANTS: ICD-10-CM

## 2021-01-14 DIAGNOSIS — I10 ESSENTIAL (PRIMARY) HYPERTENSION: ICD-10-CM

## 2021-01-14 DIAGNOSIS — Z79.82 LONG TERM (CURRENT) USE OF ASPIRIN: ICD-10-CM

## 2021-01-14 DIAGNOSIS — E87.2 ACIDOSIS: ICD-10-CM

## 2021-01-14 DIAGNOSIS — E83.52 HYPERCALCEMIA: ICD-10-CM

## 2021-01-14 DIAGNOSIS — F12.90 CANNABIS USE, UNSPECIFIED, UNCOMPLICATED: ICD-10-CM

## 2021-01-14 DIAGNOSIS — I25.10 ATHEROSCLEROTIC HEART DISEASE OF NATIVE CORONARY ARTERY WITHOUT ANGINA PECTORIS: ICD-10-CM

## 2021-01-14 DIAGNOSIS — Z95.5 PRESENCE OF CORONARY ANGIOPLASTY IMPLANT AND GRAFT: ICD-10-CM

## 2021-01-14 DIAGNOSIS — E78.5 HYPERLIPIDEMIA, UNSPECIFIED: ICD-10-CM

## 2021-02-16 ENCOUNTER — APPOINTMENT (OUTPATIENT)
Dept: GASTROENTEROLOGY | Facility: CLINIC | Age: 76
End: 2021-02-16
Payer: MEDICARE

## 2021-02-16 VITALS
TEMPERATURE: 97 F | SYSTOLIC BLOOD PRESSURE: 117 MMHG | WEIGHT: 159 LBS | DIASTOLIC BLOOD PRESSURE: 64 MMHG | BODY MASS INDEX: 23.55 KG/M2 | HEART RATE: 80 BPM | HEIGHT: 69 IN

## 2021-02-16 DIAGNOSIS — Z86.010 PERSONAL HISTORY OF COLONIC POLYPS: ICD-10-CM

## 2021-02-16 PROCEDURE — 99204 OFFICE O/P NEW MOD 45 MIN: CPT

## 2021-02-16 NOTE — HISTORY OF PRESENT ILLNESS
[FreeTextEntry1] : Patient had an episode January 4 of intractable vomiting fast saying several hours for which he went to the emergency room and a CAT scan was performed, which was unrevealing. He has significant vascular disease, and is on xaelto for multiple stents. He denies abdominal pain at this time, but has early satiety, and weight loss is no history of gallstone disease and his last colonoscopy in 2017 showed multiple adenomatous polyps. He denies any rectal bleeding or diarrhea with the episode. He denies fever, and is eating normally at this time

## 2021-02-16 NOTE — PHYSICAL EXAM
[General Appearance - In No Acute Distress] : in no acute distress [General Appearance - Alert] : alert [Sclera] : the sclera and conjunctiva were normal [PERRL With Normal Accommodation] : pupils were equal in size, round, and reactive to light [Extraocular Movements] : extraocular movements were intact [Outer Ear] : the ears and nose were normal in appearance [Oropharynx] : the oropharynx was normal [Neck Appearance] : the appearance of the neck was normal [Neck Cervical Mass (___cm)] : no neck mass was observed [Jugular Venous Distention Increased] : there was no jugular-venous distention [Thyroid Diffuse Enlargement] : the thyroid was not enlarged [Thyroid Nodule] : there were no palpable thyroid nodules [Respiration, Rhythm And Depth] : normal respiratory rhythm and effort [Exaggerated Use Of Accessory Muscles For Inspiration] : no accessory muscle use [Systolic grade ___/6] : A grade [unfilled]/6 systolic murmur was heard. [Edema] : there was no peripheral edema [Bowel Sounds] : normal bowel sounds [Abdomen Soft] : soft [Abdomen Tenderness] : non-tender [Abdomen Mass (___ Cm)] : no abdominal mass palpated [Abnormal Walk] : normal gait [Nail Clubbing] : no clubbing  or cyanosis of the fingernails [Musculoskeletal - Swelling] : no joint swelling seen [Motor Tone] : muscle strength and tone were normal [Skin Color & Pigmentation] : normal skin color and pigmentation [Skin Turgor] : normal skin turgor [] : no rash [Deep Tendon Reflexes (DTR)] : deep tendon reflexes were 2+ and symmetric [Sensation] : the sensory exam was normal to light touch and pinprick [No Focal Deficits] : no focal deficits [Oriented To Time, Place, And Person] : oriented to person, place, and time [Impaired Insight] : insight and judgment were intact [Affect] : the affect was normal

## 2021-02-16 NOTE — ASSESSMENT
[FreeTextEntry1] : Vomiting which was short-lived after eating a hamburger that was questionable and has since resolved with a normal CAT scan of the abdomen likely an acute gastroenteritis, which was self-limited. Because of weight loss, and early satiety, and loss of appetite, and upper endoscopy will be considered #2 history of colon polyps and a family history of colon cancer in his father. Will schedule followup colonoscopy with a MiraLax prep after cardiology clearance

## 2021-03-17 DIAGNOSIS — Z01.818 ENCOUNTER FOR OTHER PREPROCEDURAL EXAMINATION: ICD-10-CM

## 2021-03-18 ENCOUNTER — APPOINTMENT (OUTPATIENT)
Dept: DISASTER EMERGENCY | Facility: CLINIC | Age: 76
End: 2021-03-18

## 2021-03-20 ENCOUNTER — APPOINTMENT (OUTPATIENT)
Dept: DISASTER EMERGENCY | Facility: CLINIC | Age: 76
End: 2021-03-20

## 2021-03-21 LAB — SARS-COV-2 N GENE NPH QL NAA+PROBE: NOT DETECTED

## 2021-03-24 ENCOUNTER — RESULT REVIEW (OUTPATIENT)
Age: 76
End: 2021-03-24

## 2021-03-24 ENCOUNTER — APPOINTMENT (OUTPATIENT)
Dept: GASTROENTEROLOGY | Facility: AMBULATORY MEDICAL SERVICES | Age: 76
End: 2021-03-24
Payer: MEDICARE

## 2021-03-24 PROCEDURE — 45380 COLONOSCOPY AND BIOPSY: CPT

## 2021-03-24 PROCEDURE — 43239 EGD BIOPSY SINGLE/MULTIPLE: CPT

## 2021-05-03 NOTE — ED ADULT NURSE NOTE - CAS DISCH ACCOMP BY
Discharge instructions given and reviewed with patient. Patient & mother-- verbalizes understanding, no questions regarding care. RN/Transport

## 2021-06-18 ENCOUNTER — OUTPATIENT (OUTPATIENT)
Dept: OUTPATIENT SERVICES | Facility: HOSPITAL | Age: 76
LOS: 1 days | End: 2021-06-18
Payer: MEDICARE

## 2021-06-18 VITALS
DIASTOLIC BLOOD PRESSURE: 63 MMHG | OXYGEN SATURATION: 99 % | TEMPERATURE: 98 F | SYSTOLIC BLOOD PRESSURE: 157 MMHG | WEIGHT: 164.91 LBS | RESPIRATION RATE: 18 BRPM | HEIGHT: 66 IN | HEART RATE: 56 BPM

## 2021-06-18 DIAGNOSIS — M75.122 COMPLETE ROTATOR CUFF TEAR OR RUPTURE OF LEFT SHOULDER, NOT SPECIFIED AS TRAUMATIC: ICD-10-CM

## 2021-06-18 DIAGNOSIS — Z98.890 OTHER SPECIFIED POSTPROCEDURAL STATES: Chronic | ICD-10-CM

## 2021-06-18 DIAGNOSIS — Z90.49 ACQUIRED ABSENCE OF OTHER SPECIFIED PARTS OF DIGESTIVE TRACT: Chronic | ICD-10-CM

## 2021-06-18 DIAGNOSIS — Z95.1 PRESENCE OF AORTOCORONARY BYPASS GRAFT: Chronic | ICD-10-CM

## 2021-06-18 DIAGNOSIS — Z01.818 ENCOUNTER FOR OTHER PREPROCEDURAL EXAMINATION: ICD-10-CM

## 2021-06-18 LAB
A1C WITH ESTIMATED AVERAGE GLUCOSE RESULT: 6.6 % — HIGH (ref 4–5.6)
ANION GAP SERPL CALC-SCNC: 4 MMOL/L — LOW (ref 5–17)
BASOPHILS # BLD AUTO: 0.06 K/UL — SIGNIFICANT CHANGE UP (ref 0–0.2)
BASOPHILS NFR BLD AUTO: 1 % — SIGNIFICANT CHANGE UP (ref 0–2)
BUN SERPL-MCNC: 26 MG/DL — HIGH (ref 7–23)
CALCIUM SERPL-MCNC: 10 MG/DL — SIGNIFICANT CHANGE UP (ref 8.5–10.1)
CHLORIDE SERPL-SCNC: 108 MMOL/L — SIGNIFICANT CHANGE UP (ref 96–108)
CO2 SERPL-SCNC: 27 MMOL/L — SIGNIFICANT CHANGE UP (ref 22–31)
CREAT SERPL-MCNC: 1.1 MG/DL — SIGNIFICANT CHANGE UP (ref 0.5–1.3)
EOSINOPHIL # BLD AUTO: 0.21 K/UL — SIGNIFICANT CHANGE UP (ref 0–0.5)
EOSINOPHIL NFR BLD AUTO: 3.4 % — SIGNIFICANT CHANGE UP (ref 0–6)
ESTIMATED AVERAGE GLUCOSE: 143 MG/DL — HIGH (ref 68–114)
GLUCOSE SERPL-MCNC: 128 MG/DL — HIGH (ref 70–99)
HCT VFR BLD CALC: 38.7 % — LOW (ref 39–50)
HGB BLD-MCNC: 12.5 G/DL — LOW (ref 13–17)
IMM GRANULOCYTES NFR BLD AUTO: 0.3 % — SIGNIFICANT CHANGE UP (ref 0–1.5)
LYMPHOCYTES # BLD AUTO: 1.86 K/UL — SIGNIFICANT CHANGE UP (ref 1–3.3)
LYMPHOCYTES # BLD AUTO: 30.4 % — SIGNIFICANT CHANGE UP (ref 13–44)
MCHC RBC-ENTMCNC: 30.3 PG — SIGNIFICANT CHANGE UP (ref 27–34)
MCHC RBC-ENTMCNC: 32.3 GM/DL — SIGNIFICANT CHANGE UP (ref 32–36)
MCV RBC AUTO: 93.9 FL — SIGNIFICANT CHANGE UP (ref 80–100)
MONOCYTES # BLD AUTO: 0.66 K/UL — SIGNIFICANT CHANGE UP (ref 0–0.9)
MONOCYTES NFR BLD AUTO: 10.8 % — SIGNIFICANT CHANGE UP (ref 2–14)
NEUTROPHILS # BLD AUTO: 3.31 K/UL — SIGNIFICANT CHANGE UP (ref 1.8–7.4)
NEUTROPHILS NFR BLD AUTO: 54.1 % — SIGNIFICANT CHANGE UP (ref 43–77)
PLATELET # BLD AUTO: 333 K/UL — SIGNIFICANT CHANGE UP (ref 150–400)
POTASSIUM SERPL-MCNC: 4.8 MMOL/L — SIGNIFICANT CHANGE UP (ref 3.5–5.3)
POTASSIUM SERPL-SCNC: 4.8 MMOL/L — SIGNIFICANT CHANGE UP (ref 3.5–5.3)
RBC # BLD: 4.12 M/UL — LOW (ref 4.2–5.8)
RBC # FLD: 12.8 % — SIGNIFICANT CHANGE UP (ref 10.3–14.5)
SODIUM SERPL-SCNC: 139 MMOL/L — SIGNIFICANT CHANGE UP (ref 135–145)
WBC # BLD: 6.12 K/UL — SIGNIFICANT CHANGE UP (ref 3.8–10.5)
WBC # FLD AUTO: 6.12 K/UL — SIGNIFICANT CHANGE UP (ref 3.8–10.5)

## 2021-06-18 PROCEDURE — 93010 ELECTROCARDIOGRAM REPORT: CPT

## 2021-06-18 PROCEDURE — 80048 BASIC METABOLIC PNL TOTAL CA: CPT

## 2021-06-18 PROCEDURE — 93005 ELECTROCARDIOGRAM TRACING: CPT

## 2021-06-18 PROCEDURE — 36415 COLL VENOUS BLD VENIPUNCTURE: CPT

## 2021-06-18 PROCEDURE — 85025 COMPLETE CBC W/AUTO DIFF WBC: CPT

## 2021-06-18 PROCEDURE — G0463: CPT | Mod: 25

## 2021-06-18 PROCEDURE — 83036 HEMOGLOBIN GLYCOSYLATED A1C: CPT

## 2021-06-18 RX ORDER — METFORMIN HYDROCHLORIDE 850 MG/1
1 TABLET ORAL
Qty: 0 | Refills: 0 | DISCHARGE

## 2021-06-18 RX ORDER — NATEGLINIDE 60 MG/1
1 TABLET, COATED ORAL
Qty: 0 | Refills: 0 | DISCHARGE

## 2021-06-18 RX ORDER — ASPIRIN/CALCIUM CARB/MAGNESIUM 324 MG
1 TABLET ORAL
Qty: 0 | Refills: 0 | DISCHARGE

## 2021-06-18 RX ORDER — INSULIN GLARGINE 100 [IU]/ML
32 INJECTION, SOLUTION SUBCUTANEOUS
Qty: 0 | Refills: 0 | DISCHARGE

## 2021-06-18 RX ORDER — RAMIPRIL 5 MG
1 CAPSULE ORAL
Qty: 0 | Refills: 0 | DISCHARGE

## 2021-06-18 RX ORDER — ALLOPURINOL 300 MG
1 TABLET ORAL
Qty: 0 | Refills: 0 | DISCHARGE

## 2021-06-18 NOTE — H&P PST ADULT - ASSESSMENT
Plan  1. Stop all NSAIDS, herbal supplements and vitamins for 7 days.  2. NPO as per ASU instructions.  3. Take the following medications ( Diltiazem, Metoprolol ) with small sips of water on the morning of your procedure/surgery.  4. Use EZ sponges as directed  5. Labs, EKG as per surgeon. COVID test on 06/22/2021, pt instructed.   6. PMD and cardiologist visit for optimization prior to surgery as per surgeon  7. Hold Metformin 24 hrs before surgery, pt instructed to follow preop instructions on Xarelto from cardiologist and surgeon , insulin/oral diabetic meds preop instructions from PCP.       77 y/o male with left shoulder rotator cuff tear. Pt reports "I was reaching out to do something then I felt pain in my shoulder." Pt complain of left shoulder pain, he takes NSAID with mild pain relief, he has limited ROM of left shoulder. He is scheduled for left shoulder open rotator cuff repair.   Plan  1. Stop all NSAIDS, herbal supplements and vitamins for 7 days.  2. NPO as per ASU instructions.  3. Take the following medications ( Diltiazem, Metoprolol ) with small sips of water on the morning of your procedure/surgery.  4. Use EZ sponges as directed  5. Labs, EKG as per surgeon. COVID test on 06/22/2021, pt instructed.   6. PMD and cardiologist visit for optimization prior to surgery as per surgeon  7. Hold Metformin 24 hrs before surgery, pt instructed to follow preop instructions on Xarelto from cardiologist and surgeon , insulin/oral diabetic meds preop instructions from PCP.

## 2021-06-18 NOTE — H&P PST ADULT - NSICDXPASTMEDICALHX_GEN_ALL_CORE_FT
PAST MEDICAL HISTORY:  Anginal pain     CAD (coronary artery disease)     Diabetes mellitus     ED (erectile dysfunction)     History of hyperlipidemia     HTN - Hypertension     Hx of gout last flare-up 1 year ago    PVD (peripheral vascular disease)      PAST MEDICAL HISTORY:  Anginal pain left    CAD (coronary artery disease)     COVID-19 vaccine series completed Moderna - 2nd dose 04/28/2021    Diabetes mellitus     ED (erectile dysfunction)     History of hyperlipidemia     HTN - Hypertension     Hx of gout last flare-up 1 year ago    PVD (peripheral vascular disease)     Rotator cuff tear

## 2021-06-18 NOTE — H&P PST ADULT - NSICDXPASTSURGICALHX_GEN_ALL_CORE_FT
PAST SURGICAL HISTORY:  H/O colonoscopy with polypectomy     h/o femoral artery repair Right femoral artery with blood clots excised after complications from an angioseal at [facility] 2000.    H/O shoulder surgery right and left    H/O therapeutic radiation intra arterial after coronary stent placed at [facility] 2003    History of appendectomy     PVD (peripheral vascular disease) Stents x 2 Left Leg    S/P primary angioplasty with coronary stents x 5      PAST SURGICAL HISTORY:  H/O colonoscopy with polypectomy     h/o femoral artery repair Right femoral artery with blood clots excised after complications from an angioseal at [facility] 2000.    H/O shoulder surgery right and left    H/O therapeutic radiation intra arterial after coronary stent placed at [facility] 2003    History of appendectomy     History of other surgery right leg blood clot 2000    PVD (peripheral vascular disease) Stents x 4 Left Leg, 4 stents right leg    S/P appendectomy 1955    S/P CABG (coronary artery bypass graft) 2012    S/P primary angioplasty with coronary stents x 5

## 2021-06-18 NOTE — H&P PST ADULT - NSICDXFAMILYHX_GEN_ALL_CORE_FT
FAMILY HISTORY:  Father  Still living? Unknown  Family history of colon cancer in father, Age at diagnosis: Age Unknown  FH: MI (myocardial infarction), Age at diagnosis: Age Unknown    Mother  Still living? Unknown  FH: MI (myocardial infarction), Age at diagnosis: Age Unknown    Sibling  Still living? Unknown  Family history of liver cancer, Age at diagnosis: Age Unknown

## 2021-06-18 NOTE — H&P PST ADULT - PRO PAIN LIFE ADAPT
decreased activity level

## 2021-06-18 NOTE — H&P PST ADULT - NSANTHTOTALSCORECAL_ENT_A_CORE
"Pt was up to void pt didn't urinate in the hat provided and pt stated \"it was just a little\". Will attempt another urine later. After IV fluids.   "
AVS reviewed. All questions and concerns answered. No further questions at this time.    
No c/o from pt at this time. VS have improved after fluids. Other assessments are unremarkable.  States her only dizziness she notices is when she is active. States she doubts she is drinking enough fluids  At home.   
Pt is up giving a urine sample. will push call light when done.   
2

## 2021-06-18 NOTE — H&P PST ADULT - NSANTHOSAYNRD_GEN_A_CORE
No. TAVO screening performed.  STOP BANG Legend: 0-2 = LOW Risk; 3-4 = INTERMEDIATE Risk; 5-8 = HIGH Risk

## 2021-06-18 NOTE — H&P PST ADULT - HISTORY OF PRESENT ILLNESS
77 y/o male with left shoulder rotator cuff tear.  77 y/o male with left shoulder rotator cuff tear. Pt reports "I was reaching out to do something then I felt pain in my shoulder." Pt complain of left shoulder pain, he takes NSAID with mild pain relief, he has limited ROM of left shoulder. He is here for PST for planned left shoulder open rotator cuff repair.

## 2021-06-19 DIAGNOSIS — M75.122 COMPLETE ROTATOR CUFF TEAR OR RUPTURE OF LEFT SHOULDER, NOT SPECIFIED AS TRAUMATIC: ICD-10-CM

## 2021-06-19 DIAGNOSIS — Z01.818 ENCOUNTER FOR OTHER PREPROCEDURAL EXAMINATION: ICD-10-CM

## 2021-06-22 ENCOUNTER — APPOINTMENT (OUTPATIENT)
Dept: DISASTER EMERGENCY | Facility: CLINIC | Age: 76
End: 2021-06-22

## 2021-06-23 LAB — SARS-COV-2 N GENE NPH QL NAA+PROBE: NOT DETECTED

## 2021-06-25 ENCOUNTER — OUTPATIENT (OUTPATIENT)
Dept: INPATIENT UNIT | Facility: HOSPITAL | Age: 76
LOS: 1 days | Discharge: HOME | End: 2021-06-25
Payer: MEDICARE

## 2021-06-25 VITALS
TEMPERATURE: 98 F | RESPIRATION RATE: 16 BRPM | OXYGEN SATURATION: 100 % | DIASTOLIC BLOOD PRESSURE: 77 MMHG | WEIGHT: 164.91 LBS | HEIGHT: 66 IN | HEART RATE: 56 BPM | SYSTOLIC BLOOD PRESSURE: 158 MMHG

## 2021-06-25 VITALS
RESPIRATION RATE: 14 BRPM | SYSTOLIC BLOOD PRESSURE: 177 MMHG | TEMPERATURE: 97 F | HEART RATE: 58 BPM | OXYGEN SATURATION: 98 % | DIASTOLIC BLOOD PRESSURE: 66 MMHG

## 2021-06-25 DIAGNOSIS — Z90.49 ACQUIRED ABSENCE OF OTHER SPECIFIED PARTS OF DIGESTIVE TRACT: Chronic | ICD-10-CM

## 2021-06-25 DIAGNOSIS — Z95.1 PRESENCE OF AORTOCORONARY BYPASS GRAFT: Chronic | ICD-10-CM

## 2021-06-25 DIAGNOSIS — Z98.890 OTHER SPECIFIED POSTPROCEDURAL STATES: Chronic | ICD-10-CM

## 2021-06-25 DIAGNOSIS — M75.122 COMPLETE ROTATOR CUFF TEAR OR RUPTURE OF LEFT SHOULDER, NOT SPECIFIED AS TRAUMATIC: ICD-10-CM

## 2021-06-25 PROCEDURE — 86850 RBC ANTIBODY SCREEN: CPT

## 2021-06-25 PROCEDURE — 36415 COLL VENOUS BLD VENIPUNCTURE: CPT

## 2021-06-25 PROCEDURE — C1713: CPT

## 2021-06-25 PROCEDURE — 86900 BLOOD TYPING SEROLOGIC ABO: CPT

## 2021-06-25 PROCEDURE — 82962 GLUCOSE BLOOD TEST: CPT

## 2021-06-25 PROCEDURE — 86901 BLOOD TYPING SEROLOGIC RH(D): CPT

## 2021-06-25 RX ORDER — RAMIPRIL 5 MG
1 CAPSULE ORAL
Qty: 0 | Refills: 0 | DISCHARGE

## 2021-06-25 RX ORDER — ACETAMINOPHEN 500 MG
1000 TABLET ORAL ONCE
Refills: 0 | Status: DISCONTINUED | OUTPATIENT
Start: 2021-06-25 | End: 2021-06-25

## 2021-06-25 RX ORDER — INDOMETHACIN 50 MG
0 CAPSULE ORAL
Qty: 0 | Refills: 0 | DISCHARGE

## 2021-06-25 RX ORDER — DILTIAZEM HCL 120 MG
1 CAPSULE, EXT RELEASE 24 HR ORAL
Qty: 0 | Refills: 0 | DISCHARGE

## 2021-06-25 RX ORDER — ALLOPURINOL 300 MG
1 TABLET ORAL
Qty: 0 | Refills: 0 | DISCHARGE

## 2021-06-25 RX ORDER — INSULIN GLARGINE 100 [IU]/ML
33 INJECTION, SOLUTION SUBCUTANEOUS
Qty: 0 | Refills: 0 | DISCHARGE

## 2021-06-25 RX ORDER — SODIUM CHLORIDE 9 MG/ML
1000 INJECTION, SOLUTION INTRAVENOUS
Refills: 0 | Status: DISCONTINUED | OUTPATIENT
Start: 2021-06-25 | End: 2021-06-25

## 2021-06-25 RX ORDER — METFORMIN HYDROCHLORIDE 850 MG/1
1 TABLET ORAL
Qty: 0 | Refills: 0 | DISCHARGE

## 2021-06-25 RX ORDER — ROSUVASTATIN CALCIUM 5 MG/1
1 TABLET ORAL
Qty: 0 | Refills: 0 | DISCHARGE

## 2021-06-25 RX ORDER — HYDROMORPHONE HYDROCHLORIDE 2 MG/ML
0.5 INJECTION INTRAMUSCULAR; INTRAVENOUS; SUBCUTANEOUS
Refills: 0 | Status: DISCONTINUED | OUTPATIENT
Start: 2021-06-25 | End: 2021-06-25

## 2021-06-25 RX ORDER — NATEGLINIDE 60 MG/1
1 TABLET, COATED ORAL
Qty: 0 | Refills: 0 | DISCHARGE

## 2021-06-25 RX ORDER — RIVAROXABAN 15 MG-20MG
1 KIT ORAL
Qty: 0 | Refills: 0 | DISCHARGE

## 2021-06-25 RX ORDER — OXYCODONE HYDROCHLORIDE 5 MG/1
5 TABLET ORAL ONCE
Refills: 0 | Status: DISCONTINUED | OUTPATIENT
Start: 2021-06-25 | End: 2021-06-25

## 2021-06-25 RX ORDER — ONDANSETRON 8 MG/1
4 TABLET, FILM COATED ORAL ONCE
Refills: 0 | Status: DISCONTINUED | OUTPATIENT
Start: 2021-06-25 | End: 2021-06-25

## 2021-06-25 RX ORDER — METOPROLOL TARTRATE 50 MG
1 TABLET ORAL
Qty: 0 | Refills: 0 | DISCHARGE

## 2021-06-25 RX ORDER — PREGABALIN 225 MG/1
1 CAPSULE ORAL
Qty: 0 | Refills: 0 | DISCHARGE

## 2021-06-25 RX ORDER — CHOLECALCIFEROL (VITAMIN D3) 125 MCG
0 CAPSULE ORAL
Qty: 0 | Refills: 0 | DISCHARGE

## 2021-06-25 RX ORDER — FENTANYL CITRATE 50 UG/ML
50 INJECTION INTRAVENOUS
Refills: 0 | Status: DISCONTINUED | OUTPATIENT
Start: 2021-06-25 | End: 2021-06-25

## 2021-06-25 NOTE — ASU DISCHARGE PLAN (ADULT/PEDIATRIC) - ASU DC SPECIAL INSTRUCTIONSFT
Rotator Cuff Repair    1) Your Shoulder will swell over the next 48hours and you can expect pain to get a bit worse. Your hand may swell also. Ice your shoulder plenty, continuously if possible. Fill up a plastic bag with ice, then wrap it in a towel or pillow case.     2) ACTIVITY: Elevate your hand and wiggle your fingers often (10x per hour). NO LIFTING ANYTHING with the arm. Be up and walking as much as you can tolerate. The more you move the better you will do.     3) BANDAGE: Expect some mild bloody drainage. It is normal. It may soak through the gauze and ACE bandage. This is mostly leftover Saline fluid coming out from surgery.     4) SHOWER: Remove bandage in 48hrs and place a Waterproof Band Aide over each of the 3 incisions. You can shower in 48 hours. No bath. Pat your incisions dry. No creams, no lotions.    5) Only reason to worry would be if pain got so severe that you cannot feel or wiggle your fingers. In this case you need to call or come to the ER. But as long as you can feel and wiggle your fingers, you are fine.    6) Call the office to schedule a follow up appointment to see Dr. DIANA in 10-14 days. Your Sutures will be removed at that point.    7) A pain Rx was sent electronically to your pharmacy, pick it up on the way home.    8) Take a full Aspirn EC 325mg daily for 2 weeks. This is to prevent blood clots. Rotator Cuff Repair    1) Your Shoulder will swell over the next 48hours and you can expect pain to get a bit worse. Your hand may swell also. Ice your shoulder plenty, continuously if possible. Fill up a plastic bag with ice, then wrap it in a towel or pillow case.     2) ACTIVITY: Elevate your hand and wiggle your fingers often (10x per hour). NO LIFTING ANYTHING with the arm. Be up and walking as much as you can tolerate. The more you move the better you will do.     3) BANDAGE: No dressing change until seen by Dr. Andrews    4) A pain Rx was sent electronically to your pharmacy, pick it up on the way home.    5) Only reason to worry would be if pain got so severe that you cannot feel or wiggle your fingers. In this case you need to call or come to the ER. But as long as you can feel and wiggle your fingers, you are fine.    6) Call the office to schedule a follow up appointment to see Dr. ANDREWS in 10-14 days. Your Sutures will be removed at that point.

## 2021-06-25 NOTE — ASU PREOP CHECKLIST - STERILIZATION AFFIRMATION
Problem: Airway Clearance - Ineffective  Goal: Achieve or maintain patent airway  1/11/2021 0314 by Netta Currie RN  Outcome: Ongoing     Problem: Gas Exchange - Impaired  Goal: Absence of hypoxia  1/11/2021 0314 by Netta Currie RN  Outcome: Ongoing     Problem: Breathing Pattern - Ineffective  Goal: Ability to achieve and maintain a regular respiratory rate  1/11/2021 0314 by Netta Currie RN  Outcome: Ongoing     Problem: Isolation Precautions - Risk of Spread of Infection  Goal: Prevent transmission of infection  1/11/2021 0314 by Netta Currie RN  Outcome: Ongoing     Problem: Skin Integrity:  Goal: Absence of new skin breakdown  Description: Absence of new skin breakdown  1/11/2021 0314 by Netta Currie RN  Outcome: Ongoing     Problem: Falls - Risk of:  Goal: Will remain free from falls  Description: Will remain free from falls. Fall risk assessment completed. Patient instructed to use call light. Bed locked and in lowest position, side rails up 2/4, call light and bedside table within reach, clutter removed, and non-skid footwear on when pt out of bed. Hourly rounds will continue.  Bed alarm on at this time  1/11/2021 0314 by Netta Currie RN  Outcome: Ongoing n/a

## 2021-06-25 NOTE — ASU DISCHARGE PLAN (ADULT/PEDIATRIC) - CARE PROVIDER_API CALL
Gurdeep Andrews)  Orthopaedic Surgery  [address] [place] [address] [place] [address]  Phone: [phone]  Fax: [phone]  Follow Up Time:

## 2021-06-25 NOTE — ASU PATIENT PROFILE, ADULT - PSH
H/O colonoscopy with polypectomy    h/o femoral artery repair  Right femoral artery with blood clots excised after complications from an angioseal at [facility] 2000.  H/O shoulder surgery  right and left  H/O therapeutic radiation  intra arterial after coronary stent placed at [facility] 2003  History of appendectomy    History of other surgery  right leg blood clot 2000  PVD (peripheral vascular disease)  Stents x 4 Left Leg, 4 stents right leg  S/P appendectomy  1955  S/P CABG (coronary artery bypass graft)  2012  S/P primary angioplasty with coronary stents x 5

## 2021-06-25 NOTE — ASU PATIENT PROFILE, ADULT - PMH
Anginal pain  left  CAD (coronary artery disease)    COVID-19 vaccine series completed  Moderna - 2nd dose 04/28/2021  Diabetes mellitus    ED (erectile dysfunction)    History of hyperlipidemia    HTN - Hypertension    Hx of gout  last flare-up 1 year ago  PVD (peripheral vascular disease)    Rotator cuff tear

## 2021-07-02 DIAGNOSIS — E78.5 HYPERLIPIDEMIA, UNSPECIFIED: ICD-10-CM

## 2021-07-02 DIAGNOSIS — Z85.828 PERSONAL HISTORY OF OTHER MALIGNANT NEOPLASM OF SKIN: ICD-10-CM

## 2021-07-02 DIAGNOSIS — E11.51 TYPE 2 DIABETES MELLITUS WITH DIABETIC PERIPHERAL ANGIOPATHY WITHOUT GANGRENE: ICD-10-CM

## 2021-07-02 DIAGNOSIS — I10 ESSENTIAL (PRIMARY) HYPERTENSION: ICD-10-CM
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
## 2021-07-02 DIAGNOSIS — Z79.01 LONG TERM (CURRENT) USE OF ANTICOAGULANTS: ICD-10-CM

## 2021-07-02 DIAGNOSIS — S46.012A STRAIN OF MUSCLE(S) AND TENDON(S) OF THE ROTATOR CUFF OF LEFT SHOULDER, INITIAL ENCOUNTER: ICD-10-CM

## 2021-07-02 DIAGNOSIS — Z87.891 PERSONAL HISTORY OF NICOTINE DEPENDENCE: ICD-10-CM

## 2021-07-02 DIAGNOSIS — Z95.1 PRESENCE OF AORTOCORONARY BYPASS GRAFT: ICD-10-CM

## 2021-07-02 DIAGNOSIS — Z95.5 PRESENCE OF CORONARY ANGIOPLASTY IMPLANT AND GRAFT: ICD-10-CM

## 2021-07-02 DIAGNOSIS — Z86.718 PERSONAL HISTORY OF OTHER VENOUS THROMBOSIS AND EMBOLISM: ICD-10-CM

## 2021-07-02 DIAGNOSIS — I25.10 ATHEROSCLEROTIC HEART DISEASE OF NATIVE CORONARY ARTERY WITHOUT ANGINA PECTORIS: ICD-10-CM

## 2021-07-02 DIAGNOSIS — M10.9 GOUT, UNSPECIFIED: ICD-10-CM

## 2021-07-02 DIAGNOSIS — I48.91 UNSPECIFIED ATRIAL FIBRILLATION: ICD-10-CM
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
## 2021-07-02 DIAGNOSIS — Z79.4 LONG TERM (CURRENT) USE OF INSULIN: ICD-10-CM

## 2021-07-02 DIAGNOSIS — Z92.3 PERSONAL HISTORY OF IRRADIATION: ICD-10-CM
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
## 2021-08-05 ENCOUNTER — APPOINTMENT (OUTPATIENT)
Dept: GASTROENTEROLOGY | Facility: CLINIC | Age: 76
End: 2021-08-05
Payer: MEDICARE

## 2021-08-05 VITALS
SYSTOLIC BLOOD PRESSURE: 150 MMHG | BODY MASS INDEX: 26.52 KG/M2 | HEART RATE: 89 BPM | WEIGHT: 165 LBS | DIASTOLIC BLOOD PRESSURE: 84 MMHG | HEIGHT: 66 IN

## 2021-08-05 DIAGNOSIS — R11.14 BILIOUS VOMITING: ICD-10-CM

## 2021-08-05 DIAGNOSIS — Z00.00 ENCOUNTER FOR GENERAL ADULT MEDICAL EXAMINATION W/OUT ABNORMAL FINDINGS: ICD-10-CM

## 2021-08-05 PROCEDURE — 99213 OFFICE O/P EST LOW 20 MIN: CPT

## 2021-08-05 NOTE — HISTORY OF PRESENT ILLNESS
[FreeTextEntry1] : Recent colonoscopy for colon polyps, was unrevealing as well as upper endoscopy. Patient is feeling well

## 2021-08-05 NOTE — REVIEW OF SYSTEMS
[As noted in HPI] : as noted in HPI [As Noted in HPI] : as noted in HPI [Limb Pain] : limb pain [Muscle Weakness] : muscle weakness [Negative] : Psychiatric

## 2021-08-05 NOTE — PHYSICAL EXAM
[General Appearance - Alert] : alert [General Appearance - In No Acute Distress] : in no acute distress [Sclera] : the sclera and conjunctiva were normal [PERRL With Normal Accommodation] : pupils were equal in size, round, and reactive to light [Extraocular Movements] : extraocular movements were intact [Outer Ear] : the ears and nose were normal in appearance [Oropharynx] : the oropharynx was normal [Neck Appearance] : the appearance of the neck was normal [Neck Cervical Mass (___cm)] : no neck mass was observed [Jugular Venous Distention Increased] : there was no jugular-venous distention [Thyroid Diffuse Enlargement] : the thyroid was not enlarged [Thyroid Nodule] : there were no palpable thyroid nodules [Respiration, Rhythm And Depth] : normal respiratory rhythm and effort [Exaggerated Use Of Accessory Muscles For Inspiration] : no accessory muscle use [Edema] : there was no peripheral edema [Bowel Sounds] : normal bowel sounds [Abdomen Soft] : soft [Abdomen Tenderness] : non-tender [Abdomen Mass (___ Cm)] : no abdominal mass palpated [Abnormal Walk] : normal gait [Nail Clubbing] : no clubbing  or cyanosis of the fingernails [Musculoskeletal - Swelling] : no joint swelling seen [Motor Tone] : muscle strength and tone were normal [Skin Color & Pigmentation] : normal skin color and pigmentation [Skin Turgor] : normal skin turgor [] : no rash [Deep Tendon Reflexes (DTR)] : deep tendon reflexes were 2+ and symmetric [Sensation] : the sensory exam was normal to light touch and pinprick [No Focal Deficits] : no focal deficits [Oriented To Time, Place, And Person] : oriented to person, place, and time [Impaired Insight] : insight and judgment were intact [Affect] : the affect was normal

## 2022-07-17 ENCOUNTER — NON-APPOINTMENT (OUTPATIENT)
Age: 77
End: 2022-07-17

## 2023-05-10 NOTE — ASU PATIENT PROFILE, ADULT - NS SC CAGE ALCOHOL CUT DOWN
Problem: Cognitive:  Goal: Knowledge of wound care  Description: Knowledge of wound care  Outcome: Progressing     Problem: Wound:  Goal: Will show signs of wound healing; wound closure and no evidence of infection  Description: Will show signs of wound healing; wound closure and no evidence of infection  Outcome: Progressing     Problem: Compression therapy:  Goal: Will be free from complications associated with compression therapy  Description: Will be free from complications associated with compression therapy  Outcome: Adequate for Discharge
no

## 2023-12-26 PROBLEM — M75.100 UNSPECIFIED ROTATOR CUFF TEAR OR RUPTURE OF UNSPECIFIED SHOULDER, NOT SPECIFIED AS TRAUMATIC: Chronic | Status: ACTIVE | Noted: 2021-06-18

## 2023-12-26 PROBLEM — Z92.29 PERSONAL HISTORY OF OTHER DRUG THERAPY: Chronic | Status: ACTIVE | Noted: 2021-06-18

## 2024-01-03 ENCOUNTER — NON-APPOINTMENT (OUTPATIENT)
Age: 79
End: 2024-01-03

## 2024-01-03 ENCOUNTER — APPOINTMENT (OUTPATIENT)
Dept: OTOLARYNGOLOGY | Facility: CLINIC | Age: 79
End: 2024-01-03
Payer: MEDICARE

## 2024-01-03 VITALS
HEIGHT: 66 IN | WEIGHT: 160 LBS | SYSTOLIC BLOOD PRESSURE: 131 MMHG | HEART RATE: 68 BPM | BODY MASS INDEX: 25.71 KG/M2 | DIASTOLIC BLOOD PRESSURE: 68 MMHG

## 2024-01-03 DIAGNOSIS — H65.90 UNSPECIFIED NONSUPPURATIVE OTITIS MEDIA, UNSPECIFIED EAR: ICD-10-CM

## 2024-01-03 DIAGNOSIS — H91.90 UNSPECIFIED HEARING LOSS, UNSPECIFIED EAR: ICD-10-CM

## 2024-01-03 PROCEDURE — 99203 OFFICE O/P NEW LOW 30 MIN: CPT | Mod: 25

## 2024-01-03 PROCEDURE — 31231 NASAL ENDOSCOPY DX: CPT

## 2024-01-03 PROCEDURE — 92567 TYMPANOMETRY: CPT

## 2024-01-03 PROCEDURE — 92557 COMPREHENSIVE HEARING TEST: CPT

## 2024-01-03 RX ORDER — FLUTICASONE PROPIONATE 50 UG/1
50 SPRAY, METERED NASAL DAILY
Qty: 1 | Refills: 2 | Status: ACTIVE | COMMUNITY
Start: 2024-01-03 | End: 1900-01-01

## 2024-01-03 RX ORDER — AMOXICILLIN 875 MG/1
875 TABLET, FILM COATED ORAL
Qty: 20 | Refills: 1 | Status: ACTIVE | COMMUNITY
Start: 2024-01-03 | End: 1900-01-01

## 2024-01-03 RX ORDER — PREDNISONE 10 MG/1
10 TABLET ORAL
Qty: 9 | Refills: 1 | Status: ACTIVE | COMMUNITY
Start: 2024-01-03 | End: 1900-01-01

## 2024-01-03 NOTE — REVIEW OF SYSTEMS
[Hearing Loss] : hearing loss [Eyes Itch] : itching of the eyes [Negative] : Heme/Lymph [Patient Intake Form Reviewed] : Patient intake form was reviewed [FreeTextEntry1] : weight loss/gain

## 2024-01-03 NOTE — DATA REVIEWED
[de-identified] : AD: Type B tymp; mild sloping to profound mixed hearing loss, 250-8000 Hz AS: Type As tymps; borderline normal haring 250-2000 Hz,moderate to mod-sev SNHL, 6560-8461 Hz REC: ENT f/u, re-eval per MD

## 2024-01-17 ENCOUNTER — APPOINTMENT (OUTPATIENT)
Dept: OTOLARYNGOLOGY | Facility: CLINIC | Age: 79
End: 2024-01-17
Payer: MEDICARE

## 2024-01-17 VITALS
WEIGHT: 160 LBS | BODY MASS INDEX: 25.11 KG/M2 | HEIGHT: 67 IN | SYSTOLIC BLOOD PRESSURE: 170 MMHG | HEART RATE: 69 BPM | DIASTOLIC BLOOD PRESSURE: 75 MMHG

## 2024-01-17 PROCEDURE — 92567 TYMPANOMETRY: CPT

## 2024-01-17 PROCEDURE — 99213 OFFICE O/P EST LOW 20 MIN: CPT

## 2024-01-17 PROCEDURE — 92557 COMPREHENSIVE HEARING TEST: CPT

## 2024-01-17 NOTE — DATA REVIEWED
[de-identified] : type C tymp AD; type A tymp AS AS: wnl to a moderately severe SNHL 250-8000 Hz AD: wnl to a severe SNHL 250-8000 Hz

## 2024-01-17 NOTE — REASON FOR VISIT
LOS:     VITALS:   T(C): 36.6 (03-01-23 @ 00:25), Max: 36.6 (03-01-23 @ 00:25)  HR: 73 (03-01-23 @ 00:25) (73 - 73)  BP: 135/73 (03-01-23 @ 00:25) (135/73 - 135/73)  RR: 18 (03-01-23 @ 00:25) (18 - 18)  SpO2: 96% (03-01-23 @ 00:25) (96% - 96%)    GENERAL: NAD, lying in bed comfortably  HEAD:  Atraumatic, Normocephalic  EYES: EOMI, PERRLA, conjunctiva and sclera clear  ENT: Moist mucous membranes  NECK: Supple, No JVD  CHEST/LUNG: Clear to auscultation bilaterally; No rales, rhonchi, wheezing, or rubs. Unlabored respirations  HEART: Regular rate and rhythm; No murmurs, rubs, or gallops  ABDOMEN: BSx4; Soft, nontender, nondistended  EXTREMITIES:  2+ Peripheral Pulses, brisk capillary refill. No clubbing, cyanosis, or edema  NERVOUS SYSTEM:  A&Ox3, no focal deficits   SKIN: No rashes or lesions [Subsequent Evaluation] : a subsequent evaluation for [FreeTextEntry2] : Rt ear  LOS:     VITALS:   T(C): 36.6 (03-01-23 @ 00:25), Max: 36.6 (03-01-23 @ 00:25)  HR: 73 (03-01-23 @ 00:25) (73 - 73)  BP: 135/73 (03-01-23 @ 00:25) (135/73 - 135/73)  RR: 18 (03-01-23 @ 00:25) (18 - 18)  SpO2: 96% (03-01-23 @ 00:25) (96% - 96%)    GENERAL: NAD, lying in bed comfortably  EYES: EOMI, PERRLA, conjunctiva and sclera clear  NECK: Supple, No JVD  CHEST/LUNG: Clear to auscultation bilaterally; No rales, rhonchi, wheezing, or rubs. Unlabored respirations  HEART: Regular rate and rhythm; III/VI holosystolic murmur auscultated best at R sternal border, second intercostal space  ABDOMEN: BSx4; Soft, nontender, nondistended  EXTREMITIES:  2+ Peripheral Pulses, brisk capillary refill. No clubbing, cyanosis, or edema  NERVOUS SYSTEM:  A&Ox3, moving all extremities spontaneously   SKIN: No rashes or lesions

## 2024-01-22 ENCOUNTER — APPOINTMENT (OUTPATIENT)
Dept: ORTHOPEDIC SURGERY | Facility: CLINIC | Age: 79
End: 2024-01-22
Payer: MEDICARE

## 2024-01-22 VITALS — HEIGHT: 67 IN | WEIGHT: 160 LBS | BODY MASS INDEX: 25.11 KG/M2

## 2024-01-22 DIAGNOSIS — M75.42 IMPINGEMENT SYNDROME OF LEFT SHOULDER: ICD-10-CM

## 2024-01-22 DIAGNOSIS — Z98.890 OTHER SPECIFIED POSTPROCEDURAL STATES: ICD-10-CM

## 2024-01-22 PROCEDURE — 99213 OFFICE O/P EST LOW 20 MIN: CPT

## 2024-01-22 PROCEDURE — 99203 OFFICE O/P NEW LOW 30 MIN: CPT

## 2024-01-25 PROBLEM — M75.42 IMPINGEMENT SYNDROME OF SHOULDER, LEFT: Status: ACTIVE | Noted: 2024-01-25

## 2024-01-25 NOTE — ASSESSMENT
[FreeTextEntry1] : The patient is a 77 yo man presenting with right shoulder pain over the past few weeks after going to the gym. The patient denies any direct trauma. His pain is anterior and lateral to the shoulder. He had a previous RCR with Dr. Andrews many years ago. He presents in sling. He reports no fall or injury. He had xrays at [facility] on 1/20/24. The patient had tried a steroid that has decreased his pain by 80%. He has pain with movement of the shoulder and weightbearing. He has pain with repetitive activity. He does feel weaker with activity.   Right shoulder exam: The patient presents with no apparent distress. Neurovascularly intact. Sensation intact to right upper extremity. No scars, rashes, or abrasions. 2+ pulses to the distal radius.Tender to palpation at anterolateral shoulder and supraspinatus. AROM  ABD 90  ER 50 IR L1. 4/5 RC strength. 5/5 Deltoid strength. +Neer. +Santiago.   [facility] 3 views but nonweightbearing views show 3 metal anchors in his greater tuberosity.  There is no arthritis and no proximal migration.  There is no obvious tumors, masses or calcifications seen.  Plan at this time is to start him in physical therapy program.  Will also get an MRI of the left shoulder to rule out recurrent rotator cuff tear.

## 2024-01-25 NOTE — HISTORY OF PRESENT ILLNESS
[6] : 6 [Shooting] : shooting [Constant] : constant [Rest] : rest [] : no [FreeTextEntry5] : pt here for rt shoulder pain. pt states he thinks he hurt it working out at the gym on 1/17/24. he went to [facility] and has a sling. [de-identified] : xr

## 2024-02-01 ENCOUNTER — APPOINTMENT (OUTPATIENT)
Dept: ORTHOPEDIC SURGERY | Facility: CLINIC | Age: 79
End: 2024-02-01

## 2024-02-03 NOTE — H&P PST ADULT - BACK
Neurosurgery CONSULTATION    Micaela Hawkins  1963    REFERRING PROVIDER  Lissette Adams MD    CHIEF COMPLAINT  Chief Complaint   Patient presents with    Diarrhea    Vomiting    Altered Mental Status    Headache New Onset on New Symptom       HISTORY OF PRESENT ILLNESS  I am seeing Micaela Ross in consultation for an evaluation at the request of Lissette Adams MD.  The patient is a 60 year old White female who presents with several day history of nausea, vomiting, and diarrhea.  Patient also reports confusion and mild headache.  Due to these symptoms, she presented to the emergency department and a CT head was performed which demonstrated a large left parietal hemorrhage with local mass effect.  CT angiogram was performed which did not demonstrate any intracranial vascular abnormality.  Neurosurgery consultation was requested.  Upon arrival to the room, the patient was awake and alert.      History reviewed. No pertinent past medical history.    History reviewed. No pertinent surgical history.    ALLERGIES:  No Known Allergies    No outpatient medications have been marked as taking for the 2/2/24 encounter (Hospital Encounter).       History reviewed. No pertinent family history.          Review of Systems:   In addition to that noted in the HPI and PMH, the remainder of the patient's multi-system review is otherwise negative.    Physical Exam:    Well developed, well nourished female in mild distress who appears stated age and answers appropriately.    Blood pressure 126/75, pulse 83, temperature 98.1 °F (36.7 °C), temperature source Oral, resp. rate (!) 21, weight 55.9 kg (123 lb 3.8 oz), SpO2 99 %.     Integument:  Skin warm and dry, no obvious lesions.    HEENT:  Head normocephalic.   Conjunctiva and lids normal, sclerae non-icteric.  Pupils equal, round and reactive to light.  EOMs full.  Right visual field deficit noted.  Ears without deformity, hearing intact bilaterally to finger rub.  Nose  without deformity, airways patent.  Normal facial symmetry and sensation.  Buccal mucosa pink and moist. Teeth in good repair.  Posterior pharynx without injection or exudates.  Palate elevates symmetrically with  phonation, tongue extends on midline without fasciculations.     Neck: Supple, full range of motion, non-tender.  Trachea midline.      Thorax: Symmetric without CVA or spinal tenderness.    Lungs: Clear to auscultation. Normal percussion.  No retractions.    Heart: Regular rate and rhythm, no murmurs or extra sounds.    Abdomen: Soft, non-tender, non-distended, no masses, bruits or hernias.  Bowel sounds are normoactive.    Breast/genitorectal Exam: Deferred.    Extremities: Without deformity, clubbing, cyanosis or edema, distal pulses intact.  Good temperature and color.  Full ROM without crepitus or laxity.    Neuro:  Cranial nerves II-XII intact.  Speech fluent.  Motor exam reveals normal tone without evidence of atrophy, fasciculations or tremors.  There is full power in the upper and lower extremity muscle groups.  Mild neglect of the right upper and right lower extremity noted.  Sensory exam intact to light touch.  Reflexes are 1/4 in the biceps, triceps, brachioradialis, patellar and Achilles tendons.  Toes are down-going.     Mental status: Alert and oriented x 3, pleasant affect.    STUDIES  I personally reviewed the CT head and CT angiogram dated February 2, 2024 and my interpretation of these images is as follows:  There is a large left parietal hemorrhage with local mass effect and no midline shift.  CT angiogram does not demonstrate any abnormal vasculature to suggest arteriovenous fistula or AVM.        ASSESSMENT  60-year-old female who presents with nausea, vomiting, and headaches for the last 2 days who was found to have a large left parietal hemorrhage.    PLAN  Exam as above.  Preliminary report from CT chest demonstrates lung mass.  Would recommend MRI brain with and without contrast  and MRI stealth to rule out underlying metastases as a cause of her intracranial hemorrhage.  We will continue to follow the patient.  Please keep patient NPO in case surgery would be a possibility tomorrow.     No deformity or limitation of movement

## 2024-02-28 ENCOUNTER — APPOINTMENT (OUTPATIENT)
Dept: OTOLARYNGOLOGY | Facility: CLINIC | Age: 79
End: 2024-02-28
Payer: MEDICARE

## 2024-02-28 VITALS
SYSTOLIC BLOOD PRESSURE: 141 MMHG | DIASTOLIC BLOOD PRESSURE: 53 MMHG | HEIGHT: 67 IN | HEART RATE: 46 BPM | BODY MASS INDEX: 25.11 KG/M2 | WEIGHT: 160 LBS

## 2024-02-28 DIAGNOSIS — H69.90 UNSPECIFIED EUSTACHIAN TUBE DISORDER, UNSPECIFIED EAR: ICD-10-CM

## 2024-02-28 PROCEDURE — 92567 TYMPANOMETRY: CPT

## 2024-02-28 PROCEDURE — 99213 OFFICE O/P EST LOW 20 MIN: CPT

## 2024-02-28 PROCEDURE — 92557 COMPREHENSIVE HEARING TEST: CPT

## 2024-02-28 NOTE — DATA REVIEWED
[de-identified] : type A tymps AU ETD AD: WNL AS AS: WNL to a mod-severe SNHL 250-8000 Hz AD: WNL to a severe SNHL 250-8000 Hz

## 2024-02-28 NOTE — HISTORY OF PRESENT ILLNESS
[de-identified] : OME FOLLOW UP FEELING BETTER HEARING IMPROVING MEDICAL HX REVIEWED HEADACHE SOMETIME; MRI WNL AS PER PATIENT

## 2024-05-11 ENCOUNTER — EMERGENCY (EMERGENCY)
Facility: HOSPITAL | Age: 79
LOS: 0 days | Discharge: HOME | End: 2024-05-11
Attending: EMERGENCY MEDICINE
Payer: MEDICARE

## 2024-05-11 VITALS
DIASTOLIC BLOOD PRESSURE: 63 MMHG | HEART RATE: 63 BPM | SYSTOLIC BLOOD PRESSURE: 150 MMHG | RESPIRATION RATE: 18 BRPM | TEMPERATURE: 98 F | OXYGEN SATURATION: 100 %

## 2024-05-11 VITALS — WEIGHT: 164.91 LBS | HEIGHT: 67 IN

## 2024-05-11 DIAGNOSIS — E78.00 PURE HYPERCHOLESTEROLEMIA, UNSPECIFIED: ICD-10-CM

## 2024-05-11 DIAGNOSIS — Z95.1 PRESENCE OF AORTOCORONARY BYPASS GRAFT: Chronic | ICD-10-CM

## 2024-05-11 DIAGNOSIS — I10 ESSENTIAL (PRIMARY) HYPERTENSION: ICD-10-CM

## 2024-05-11 DIAGNOSIS — E11.9 TYPE 2 DIABETES MELLITUS WITHOUT COMPLICATIONS: ICD-10-CM

## 2024-05-11 DIAGNOSIS — R19.04 LEFT LOWER QUADRANT ABDOMINAL SWELLING, MASS AND LUMP: ICD-10-CM

## 2024-05-11 DIAGNOSIS — Z98.890 OTHER SPECIFIED POSTPROCEDURAL STATES: Chronic | ICD-10-CM

## 2024-05-11 DIAGNOSIS — Z90.49 ACQUIRED ABSENCE OF OTHER SPECIFIED PARTS OF DIGESTIVE TRACT: Chronic | ICD-10-CM

## 2024-05-11 PROCEDURE — 99283 EMERGENCY DEPT VISIT LOW MDM: CPT | Mod: FS

## 2024-05-11 PROCEDURE — 99282 EMERGENCY DEPT VISIT SF MDM: CPT

## 2024-05-11 NOTE — ED ADULT TRIAGE NOTE - NSWEIGHTCALCTOOLDRUG_GEN_A_CORE
PATIENT NAME: SALVATORE MOISE  MEDICAL RECORD NUMBER: [id number]  ACCOUNT NUMBER: [id number]  ADMIT DATE: 06/07/2017  DISCHARGE DATE: 06/09/2017  ATTENDING PHYSICIAN: TENZIN MCPHERSON MD  ROOM #: [id number]  SERVICE: OBE    DISCHARGE SUMMARY      Please see the admission history and physical.  The consultation done by Dr. Otto Del Cid from ID, Dr. Felix Ang from Oncology and the progress notes  for this admission.    ADMITTING DIAGNOSES:  1.  Abdominal pain and chills, thought to be due to cholangitis.  2.  History of pulmonary nodules.  3.  History of Hodgkin disease.  4.  Acute kidney injury.  5.  Elevated lactic acid.  6.  Hyponatremia.  7.  Transaminitis.    DISCHARGE DIAGNOSES:  1.  Cholangitis with history of gallbladder cancer.  The patient underwent  procedure by IR and the stent was internalized with the new drain.  The  patient was seen by ID Service and the patient will be discharged on  ertapenem for another 12 days.  Case management is looking into insurance  for outpatient antibiotics.  2.  History of pulmonary nodules.  The patient underwent biopsy of these by IR.  I have discussed with Dr. Ang from Oncology.  We will follow up with  the patient for path report as outpatient.  3.  History of Hodgkin disease, which has been currently in remission.  4.  Acute kidney injury, which is now resolved, likely the above cholangitis.  5.  Hyponatremia, which is now getting better.  6.  Elevated lactic acid, likely due to the above infection, which is now  normalized.  7.  Transaminitis due to above cholangitis and history of gallbladder cancer.  LFTs are not trending down.    BRIEF HOSPITAL COURSE:  The patient is a pleasant 53-year-old gentleman, who has  had recurrent admissions in the recent past, was admitted because of chills and  abdominal pain.  This was thought to be due to cholangitis.  Oncology and IR  Service were consulted.  The patient underwent embolization of the external  drain.  The patient  will begin IV antibiotics for another 12 days as outpatient,  this is arranged by the .  Also, the patient underwent biopsy of the  pulmonary nodules.  Path will be followed up by Oncology Service as outpatient.  The patient had hyponatremia on admission, this is now getting better.  The  patient also had elevated lactic acid, which is now resolved.  The patient had  acute kidney injury on admission, which is now resolved as well.  So, the  patient will be discharged pending arrangement of IV antibiotics as outpatient.    PHYSICAL EXAMINATION:  VITAL SIGNS:  When I saw him this morning, his vital  signs were temperature 36.8, pulse of 93, respirations of 16, saturation 97%,  and blood pressure 123/74.  HEENT:  Moist oral mucosa.  NECK:  Supple.  LUNGS:  Clear bilaterally.  Breath sounds equal.  CVS:  S1 and S2.  Regular rate and rhythm.  No murmur.  ABDOMEN:  Soft, nondistended, nontender.  Right quadrant dressing noted at the  site of the prior drain.  EXTREMITIES:  No edema.    DISPOSITION:  Discharged.    MEDICATIONS:  Per med rec.    DIET:  Soft diet.    ACTIVITY:  As tolerated.    FOLLOWUP:  1.  The patient is to follow up with PCP in 2 to 3 days, also with Oncology  Service when the biopsy results available.  Dr. Ang will call him.  ID  Service for the antibiotics.  2.  Discussed with Dr. Ang.  3.  Time spent to coordinate the above discharge was more than 30 minutes.  4.  PCP is Dr. Mayberry.  We will notify about the discharge plan.          Jackie Ridley M.D. INTERNAL MEDICINE      CC:   Jackie Ridley M.D. SR/CHAPIN  DD: 06/09/2017  DT: 06/10/2017  TD: 15:26  TT: 11:43  [id number]           Electronically Signed On 06/12/2017 08:01  __________________________________________________   JACKIE MARIN      used

## 2024-05-11 NOTE — ED STATDOCS - PATIENT PORTAL LINK FT
You can access the FollowMyHealth Patient Portal offered by [facility] by registering at the following website: [email or web address]. By joining [employer]’s FollowMyHealth portal, you will also be able to view your health information using other applications (apps) compatible with our system.

## 2024-05-11 NOTE — ED STATDOCS - NSICDXPASTSURGICALHX_GEN_ALL_CORE_FT
PAST SURGICAL HISTORY:  H/O colonoscopy with polypectomy     h/o femoral artery repair Right femoral artery with blood clots excised after complications from an angioseal at [facility] 2000.    H/O shoulder surgery right and left    H/O therapeutic radiation intra arterial after coronary stent placed at [facility] 2003    History of appendectomy     History of other surgery right leg blood clot 2000    PVD (peripheral vascular disease) Stents x 4 Left Leg, 4 stents right leg    S/P appendectomy 1955    S/P CABG (coronary artery bypass graft) 2012    S/P primary angioplasty with coronary stents x 5

## 2024-05-11 NOTE — ED STATDOCS - ATTENDING APP SHARED VISIT CONTRIBUTION OF CARE
I, Jorge Gonzalez MD, personally saw the patient with ACP.  I have personally performed a face to face diagnostic evaluation on this patient.  I have reviewed the ACP note and agree with the history, exam, and plan of care, except as noted. I personally made/approved the management plan and take responsibility for the patient management   The initial assessment was performed by myself and then the patient was handed off to the ACP. The patient was followed and re-evaluated by the ACP. All labs, imaging and procedures were evaluated and performed by the ACP and I was available for consultation if any questions in the patients care came up.   I personally made/approved the management plan and take responsibility for the patient management.

## 2024-05-11 NOTE — ED ADULT NURSE NOTE - CHIEF COMPLAINT QUOTE
Pt presents to ER c/o fluid collection around surgical site. Pt reports he had a stent put in 1.5 weeks ago through right femoral artery. Pt was seen by MD 2 days PTA and fluid was drained around site and pt was prescribed PO abx. Pt reports fluid at site increased today. Denies pain/fever/chills

## 2024-05-11 NOTE — ED STATDOCS - NSFOLLOWUPINSTRUCTIONS_ED_ALL_ED_FT
Continue your antibiotics as prescribed.  Follow up with your surgeon on Monday  Any worsening of symptoms or new concerning symptoms return to the ED

## 2024-05-11 NOTE — ED STATDOCS - PHYSICAL EXAMINATION
Constitutional: NAD AAOx3  Eyes: PERRLA EOMI  Head: Normocephalic atraumatic  Mouth: MMM  Cardiac: regular rate   Resp: unlabored breathing  GI: Abd s/nt/nd  Neuro: grossly normal and intact  Skin: No visible rashes. Clean surgical site no obvious surrounding infection no fluctuance.  No tenderness no concern for further surgical site infection or abscess Constitutional: NAD AAOx3  Eyes: PERRLA EOMI  Head: Normocephalic atraumatic  Mouth: MMM  Cardiac: regular rate   Resp: unlabored breathing clear b/l   GI: Abd s/nt/nd  Neuro: grossly normal and intact  Skin: No visible rashes. Clean surgical site no obvious surrounding infection no fluctuance.  No tenderness no concern for further surgical site infection or abscess

## 2024-05-11 NOTE — ED ADULT NURSE NOTE - NSICDXPASTSURGICALHX_GEN_ALL_CORE_FT
PAST SURGICAL HISTORY:  H/O colonoscopy with polypectomy     h/o femoral artery repair Right femoral artery with blood clots excised after complications from an angioseal at [facility] 2000.    H/O shoulder surgery right and left    H/O therapeutic radiation intra arterial after coronary stent placed at [facility] 2003    History of appendectomy     History of other surgery right leg blood clot 2000    PVD (peripheral vascular disease) Stents x 4 Left Leg, 4 stents right leg    S/P appendectomy 1955    S/P CABG (coronary artery bypass graft) 2012    S/P primary angioplasty with coronary stents x 5

## 2024-05-11 NOTE — ED STATDOCS - NS_ ATTENDINGSCRIBEDETAILS _ED_A_ED_FT
I, Jorge Gonzalez MD,  performed the initial face to face bedside interview with this patient regarding history of present illness, review of symptoms and relevant past medical, social and family history.  I completed an independent physical examination.  I was the initial provider who evaluated this patient.   I personally saw the patient and performed a substantive portion of the visit including all aspects of the medical decision making.  The history, relevant review of systems, past medical and surgical history, medical decision making, and physical examination was documented by the scribe in my presence and I attest to the accuracy of the documentation.

## 2024-05-11 NOTE — ED STATDOCS - NSICDXPASTMEDICALHX_GEN_ALL_CORE_FT
PAST MEDICAL HISTORY:  Anginal pain left    CAD (coronary artery disease)     COVID-19 vaccine series completed Moderna - 2nd dose 04/28/2021    Diabetes mellitus     ED (erectile dysfunction)     History of hyperlipidemia     HTN - Hypertension     Hx of gout last flare-up 1 year ago    PVD (peripheral vascular disease)     Rotator cuff tear

## 2024-05-11 NOTE — ED STATDOCS - OBJECTIVE STATEMENT
79-year-old male multiple medical history of hypertension high cholesterol diabetes presents the emergency department for swelling in left groin.  Patient states that he had a vascular procedure last week with a stent placed in his left groin.  After the procedure patient had some swelling near the surgical site went back to his vascular surgeon where they drained the area and clear blood-tinged fluid came out patient was placed on antibiotics states that he is still having some leakage of this clear fluid from the wound so came in for evaluation.  No fevers no pain.  Exam with clean surgical site no obvious surrounding infection no fluctuance.  Patient likely with seroma already on antibiotics already had it drained there is no tenderness no concern for further surgical site infection or abscess patient to continue with antibiotics and follow-up with his surgeon patient is comfortable with this plan

## 2024-07-02 ENCOUNTER — EMERGENCY (EMERGENCY)
Facility: HOSPITAL | Age: 79
LOS: 0 days | Discharge: ANOTHER HOSPITAL | End: 2024-07-02
Attending: EMERGENCY MEDICINE
Payer: MEDICARE

## 2024-07-02 VITALS
TEMPERATURE: 98 F | SYSTOLIC BLOOD PRESSURE: 157 MMHG | RESPIRATION RATE: 18 BRPM | DIASTOLIC BLOOD PRESSURE: 58 MMHG | HEART RATE: 81 BPM | OXYGEN SATURATION: 100 %

## 2024-07-02 VITALS — WEIGHT: 171.96 LBS | HEIGHT: 67 IN

## 2024-07-02 DIAGNOSIS — E11.9 TYPE 2 DIABETES MELLITUS WITHOUT COMPLICATIONS: ICD-10-CM

## 2024-07-02 DIAGNOSIS — I10 ESSENTIAL (PRIMARY) HYPERTENSION: ICD-10-CM

## 2024-07-02 DIAGNOSIS — L76.21 POSTPROCEDURAL HEMORRHAGE OF SKIN AND SUBCUTANEOUS TISSUE FOLLOWING A DERMATOLOGIC PROCEDURE: ICD-10-CM

## 2024-07-02 DIAGNOSIS — Z98.890 OTHER SPECIFIED POSTPROCEDURAL STATES: ICD-10-CM

## 2024-07-02 DIAGNOSIS — T81.31XA DISRUPTION OF EXTERNAL OPERATION (SURGICAL) WOUND, NOT ELSEWHERE CLASSIFIED, INITIAL ENCOUNTER: ICD-10-CM

## 2024-07-02 DIAGNOSIS — Z90.49 ACQUIRED ABSENCE OF OTHER SPECIFIED PARTS OF DIGESTIVE TRACT: Chronic | ICD-10-CM

## 2024-07-02 DIAGNOSIS — Z95.1 PRESENCE OF AORTOCORONARY BYPASS GRAFT: Chronic | ICD-10-CM

## 2024-07-02 DIAGNOSIS — E78.5 HYPERLIPIDEMIA, UNSPECIFIED: ICD-10-CM

## 2024-07-02 DIAGNOSIS — Z98.890 OTHER SPECIFIED POSTPROCEDURAL STATES: Chronic | ICD-10-CM

## 2024-07-02 DIAGNOSIS — I25.10 ATHEROSCLEROTIC HEART DISEASE OF NATIVE CORONARY ARTERY WITHOUT ANGINA PECTORIS: ICD-10-CM

## 2024-07-02 LAB
ALBUMIN SERPL ELPH-MCNC: 3.5 G/DL — SIGNIFICANT CHANGE UP (ref 3.3–5)
ALP SERPL-CCNC: 80 U/L — SIGNIFICANT CHANGE UP (ref 40–120)
ALT FLD-CCNC: 15 U/L — SIGNIFICANT CHANGE UP (ref 12–78)
ANION GAP SERPL CALC-SCNC: 14 MMOL/L — SIGNIFICANT CHANGE UP (ref 5–17)
APTT BLD: 25 SEC — SIGNIFICANT CHANGE UP (ref 24.5–35.6)
AST SERPL-CCNC: 26 U/L — SIGNIFICANT CHANGE UP (ref 15–37)
BASOPHILS # BLD AUTO: 0.05 K/UL — SIGNIFICANT CHANGE UP (ref 0–0.2)
BASOPHILS NFR BLD AUTO: 0.6 % — SIGNIFICANT CHANGE UP (ref 0–2)
BILIRUB SERPL-MCNC: 0.5 MG/DL — SIGNIFICANT CHANGE UP (ref 0.2–1.2)
BLD GP AB SCN SERPL QL: SIGNIFICANT CHANGE UP
BUN SERPL-MCNC: 22 MG/DL — SIGNIFICANT CHANGE UP (ref 7–23)
CALCIUM SERPL-MCNC: 10.2 MG/DL — HIGH (ref 8.5–10.1)
CHLORIDE SERPL-SCNC: 110 MMOL/L — HIGH (ref 96–108)
CO2 SERPL-SCNC: 16 MMOL/L — LOW (ref 22–31)
CREAT SERPL-MCNC: 1.59 MG/DL — HIGH (ref 0.5–1.3)
EGFR: 44 ML/MIN/1.73M2 — LOW
EOSINOPHIL # BLD AUTO: 0.06 K/UL — SIGNIFICANT CHANGE UP (ref 0–0.5)
EOSINOPHIL NFR BLD AUTO: 0.7 % — SIGNIFICANT CHANGE UP (ref 0–6)
GLUCOSE BLDC GLUCOMTR-MCNC: 165 MG/DL — HIGH (ref 70–99)
GLUCOSE SERPL-MCNC: 140 MG/DL — HIGH (ref 70–99)
HCT VFR BLD CALC: 33.7 % — LOW (ref 39–50)
HGB BLD-MCNC: 10.9 G/DL — LOW (ref 13–17)
IMM GRANULOCYTES NFR BLD AUTO: 0.6 % — SIGNIFICANT CHANGE UP (ref 0–0.9)
INR BLD: 1.06 RATIO — SIGNIFICANT CHANGE UP (ref 0.85–1.18)
LYMPHOCYTES # BLD AUTO: 2.99 K/UL — SIGNIFICANT CHANGE UP (ref 1–3.3)
LYMPHOCYTES # BLD AUTO: 34.7 % — SIGNIFICANT CHANGE UP (ref 13–44)
MCHC RBC-ENTMCNC: 31.5 PG — SIGNIFICANT CHANGE UP (ref 27–34)
MCHC RBC-ENTMCNC: 32.3 GM/DL — SIGNIFICANT CHANGE UP (ref 32–36)
MCV RBC AUTO: 97.4 FL — SIGNIFICANT CHANGE UP (ref 80–100)
MONOCYTES # BLD AUTO: 0.86 K/UL — SIGNIFICANT CHANGE UP (ref 0–0.9)
MONOCYTES NFR BLD AUTO: 10 % — SIGNIFICANT CHANGE UP (ref 2–14)
NEUTROPHILS # BLD AUTO: 4.61 K/UL — SIGNIFICANT CHANGE UP (ref 1.8–7.4)
NEUTROPHILS NFR BLD AUTO: 53.4 % — SIGNIFICANT CHANGE UP (ref 43–77)
PLATELET # BLD AUTO: 516 K/UL — HIGH (ref 150–400)
POTASSIUM SERPL-MCNC: 3.9 MMOL/L — SIGNIFICANT CHANGE UP (ref 3.5–5.3)
POTASSIUM SERPL-SCNC: 3.9 MMOL/L — SIGNIFICANT CHANGE UP (ref 3.5–5.3)
PROT SERPL-MCNC: 7.4 GM/DL — SIGNIFICANT CHANGE UP (ref 6–8.3)
PROTHROM AB SERPL-ACNC: 12 SEC — SIGNIFICANT CHANGE UP (ref 9.5–13)
RBC # BLD: 3.46 M/UL — LOW (ref 4.2–5.8)
RBC # FLD: 13 % — SIGNIFICANT CHANGE UP (ref 10.3–14.5)
SODIUM SERPL-SCNC: 140 MMOL/L — SIGNIFICANT CHANGE UP (ref 135–145)
WBC # BLD: 8.62 K/UL — SIGNIFICANT CHANGE UP (ref 3.8–10.5)
WBC # FLD AUTO: 8.62 K/UL — SIGNIFICANT CHANGE UP (ref 3.8–10.5)

## 2024-07-02 PROCEDURE — 93005 ELECTROCARDIOGRAM TRACING: CPT

## 2024-07-02 PROCEDURE — 73706 CT ANGIO LWR EXTR W/O&W/DYE: CPT | Mod: MC,LT

## 2024-07-02 PROCEDURE — 85730 THROMBOPLASTIN TIME PARTIAL: CPT

## 2024-07-02 PROCEDURE — 80053 COMPREHEN METABOLIC PANEL: CPT

## 2024-07-02 PROCEDURE — 85610 PROTHROMBIN TIME: CPT

## 2024-07-02 PROCEDURE — 36000 PLACE NEEDLE IN VEIN: CPT | Mod: XU

## 2024-07-02 PROCEDURE — 85025 COMPLETE CBC W/AUTO DIFF WBC: CPT

## 2024-07-02 PROCEDURE — 82962 GLUCOSE BLOOD TEST: CPT

## 2024-07-02 PROCEDURE — 36415 COLL VENOUS BLD VENIPUNCTURE: CPT

## 2024-07-02 PROCEDURE — 86900 BLOOD TYPING SEROLOGIC ABO: CPT

## 2024-07-02 PROCEDURE — 93010 ELECTROCARDIOGRAM REPORT: CPT

## 2024-07-02 PROCEDURE — 73706 CT ANGIO LWR EXTR W/O&W/DYE: CPT | Mod: 26,LT,MC

## 2024-07-02 PROCEDURE — 86901 BLOOD TYPING SEROLOGIC RH(D): CPT

## 2024-07-02 PROCEDURE — 99285 EMERGENCY DEPT VISIT HI MDM: CPT

## 2024-07-02 PROCEDURE — 99285 EMERGENCY DEPT VISIT HI MDM: CPT | Mod: 25

## 2024-07-02 PROCEDURE — 86850 RBC ANTIBODY SCREEN: CPT

## 2024-07-02 RX ORDER — SODIUM CHLORIDE 0.9 % (FLUSH) 0.9 %
1000 SYRINGE (ML) INJECTION ONCE
Refills: 0 | Status: COMPLETED | OUTPATIENT
Start: 2024-07-02 | End: 2024-07-02

## 2024-07-02 RX ADMIN — Medication 1000 MILLILITER(S): at 10:59

## 2024-11-15 NOTE — HISTORY OF PRESENT ILLNESS
[de-identified] : RIGHT EAR HEARING LOSS FOR A MONTH AND A HALF NO OTHER OTOLOGIC SYMPTOMS MEDICAL HX REVIEWED
no

## 2024-11-15 NOTE — ED ADULT TRIAGE NOTE - CHIEF COMPLAINT QUOTE
Dr. Jeff - [facility]  [address] [address]  Phone: [phone]  Fax: [phone] November 15, 2024    Anjana Blackman  [address] [place] [address]      To Whom It May Concern:    Please allow Anjana Blackman to sit as needed during jury duty due to lumbar disc disease.     If you have any questions or concerns, please feel free to call my office.    Sincerely,        Laureano Jeff III, MD      Pt presents to ER c/o fluid collection around surgical site. Pt reports he had a stent put in 1.5 weeks ago through right femoral artery. Pt was seen by MD 2 days PTA and fluid was drained around site and pt was prescribed PO abx. Pt reports fluid at site increased today. Denies pain/fever/chills

## 2025-02-26 ENCOUNTER — APPOINTMENT (OUTPATIENT)
Dept: OTOLARYNGOLOGY | Facility: CLINIC | Age: 80
End: 2025-02-26
Payer: MEDICARE

## 2025-02-26 VITALS
HEIGHT: 67 IN | SYSTOLIC BLOOD PRESSURE: 152 MMHG | WEIGHT: 160 LBS | HEART RATE: 67 BPM | DIASTOLIC BLOOD PRESSURE: 71 MMHG | BODY MASS INDEX: 25.11 KG/M2

## 2025-02-26 DIAGNOSIS — H91.10 PRESBYCUSIS, UNSPECIFIED EAR: ICD-10-CM

## 2025-02-26 PROCEDURE — 99213 OFFICE O/P EST LOW 20 MIN: CPT

## 2025-02-26 RX ORDER — INSULIN GLARGINE 100 [IU]/ML
INJECTION, SOLUTION SUBCUTANEOUS
Refills: 0 | Status: ACTIVE | COMMUNITY

## 2025-02-26 RX ORDER — NATEGLINIDE 120 MG/1
TABLET ORAL
Refills: 0 | Status: ACTIVE | COMMUNITY

## 2025-02-26 RX ORDER — DILTIAZEM HYDROCHLORIDE 120 MG/1
120 TABLET ORAL
Refills: 0 | Status: ACTIVE | COMMUNITY

## 2025-02-26 RX ORDER — RAMIPRIL 5 MG/1
CAPSULE ORAL
Refills: 0 | Status: ACTIVE | COMMUNITY

## 2025-02-26 RX ORDER — ALLOPURINOL 200 MG/1
TABLET ORAL
Refills: 0 | Status: ACTIVE | COMMUNITY

## 2025-07-29 ENCOUNTER — APPOINTMENT (OUTPATIENT)
Facility: CLINIC | Age: 80
End: 2025-07-29
Payer: MEDICARE

## 2025-07-29 ENCOUNTER — RESULT REVIEW (OUTPATIENT)
Age: 80
End: 2025-07-29

## 2025-07-29 VITALS — BODY MASS INDEX: 25.11 KG/M2 | HEIGHT: 67 IN | WEIGHT: 160 LBS

## 2025-07-29 PROCEDURE — 99213 OFFICE O/P EST LOW 20 MIN: CPT

## 2025-08-01 ENCOUNTER — NON-APPOINTMENT (OUTPATIENT)
Age: 80
End: 2025-08-01

## 2025-08-01 ENCOUNTER — OUTPATIENT (OUTPATIENT)
Dept: OUTPATIENT SERVICES | Facility: HOSPITAL | Age: 80
LOS: 1 days | End: 2025-08-01
Payer: MEDICARE

## 2025-08-01 VITALS
TEMPERATURE: 98 F | HEART RATE: 60 BPM | SYSTOLIC BLOOD PRESSURE: 130 MMHG | OXYGEN SATURATION: 98 % | HEIGHT: 68 IN | DIASTOLIC BLOOD PRESSURE: 78 MMHG | RESPIRATION RATE: 16 BRPM | WEIGHT: 170.2 LBS

## 2025-08-01 DIAGNOSIS — S46.212A STRAIN OF MUSCLE, FASCIA AND TENDON OF OTHER PARTS OF BICEPS, LEFT ARM, INITIAL ENCOUNTER: ICD-10-CM

## 2025-08-01 DIAGNOSIS — Z98.890 OTHER SPECIFIED POSTPROCEDURAL STATES: Chronic | ICD-10-CM

## 2025-08-01 DIAGNOSIS — Z90.49 ACQUIRED ABSENCE OF OTHER SPECIFIED PARTS OF DIGESTIVE TRACT: Chronic | ICD-10-CM

## 2025-08-01 DIAGNOSIS — Z95.1 PRESENCE OF AORTOCORONARY BYPASS GRAFT: Chronic | ICD-10-CM

## 2025-08-01 DIAGNOSIS — I25.10 ATHEROSCLEROTIC HEART DISEASE OF NATIVE CORONARY ARTERY WITHOUT ANGINA PECTORIS: ICD-10-CM

## 2025-08-01 DIAGNOSIS — E11.9 TYPE 2 DIABETES MELLITUS WITHOUT COMPLICATIONS: ICD-10-CM

## 2025-08-01 DIAGNOSIS — Z01.818 ENCOUNTER FOR OTHER PREPROCEDURAL EXAMINATION: ICD-10-CM

## 2025-08-01 LAB
ANION GAP SERPL CALC-SCNC: 6 MMOL/L — SIGNIFICANT CHANGE UP (ref 5–17)
BASOPHILS # BLD AUTO: 0.05 K/UL — SIGNIFICANT CHANGE UP (ref 0–0.2)
BASOPHILS NFR BLD AUTO: 0.8 % — SIGNIFICANT CHANGE UP (ref 0–2)
BUN SERPL-MCNC: 31 MG/DL — HIGH (ref 7–23)
CALCIUM SERPL-MCNC: 9.6 MG/DL — SIGNIFICANT CHANGE UP (ref 8.5–10.1)
CHLORIDE SERPL-SCNC: 109 MMOL/L — HIGH (ref 96–108)
CO2 SERPL-SCNC: 24 MMOL/L — SIGNIFICANT CHANGE UP (ref 22–31)
CREAT SERPL-MCNC: 1.39 MG/DL — HIGH (ref 0.5–1.3)
EGFR: 51 ML/MIN/1.73M2 — LOW
EGFR: 51 ML/MIN/1.73M2 — LOW
EOSINOPHIL # BLD AUTO: 0.11 K/UL — SIGNIFICANT CHANGE UP (ref 0–0.5)
EOSINOPHIL NFR BLD AUTO: 1.7 % — SIGNIFICANT CHANGE UP (ref 0–6)
GLUCOSE SERPL-MCNC: 167 MG/DL — HIGH (ref 70–99)
HCT VFR BLD CALC: 34.9 % — LOW (ref 39–50)
HGB BLD-MCNC: 11.7 G/DL — LOW (ref 13–17)
IMM GRANULOCYTES # BLD AUTO: 0.02 K/UL — SIGNIFICANT CHANGE UP (ref 0–0.07)
IMM GRANULOCYTES NFR BLD AUTO: 0.3 % — SIGNIFICANT CHANGE UP (ref 0–0.9)
LYMPHOCYTES # BLD AUTO: 1.7 K/UL — SIGNIFICANT CHANGE UP (ref 1–3.3)
LYMPHOCYTES NFR BLD AUTO: 26.9 % — SIGNIFICANT CHANGE UP (ref 13–44)
MCHC RBC-ENTMCNC: 32.1 PG — SIGNIFICANT CHANGE UP (ref 27–34)
MCHC RBC-ENTMCNC: 33.5 G/DL — SIGNIFICANT CHANGE UP (ref 32–36)
MCV RBC AUTO: 95.6 FL — SIGNIFICANT CHANGE UP (ref 80–100)
MONOCYTES # BLD AUTO: 0.7 K/UL — SIGNIFICANT CHANGE UP (ref 0–0.9)
MONOCYTES NFR BLD AUTO: 11.1 % — SIGNIFICANT CHANGE UP (ref 2–14)
NEUTROPHILS # BLD AUTO: 3.75 K/UL — SIGNIFICANT CHANGE UP (ref 1.8–7.4)
NEUTROPHILS NFR BLD AUTO: 59.2 % — SIGNIFICANT CHANGE UP (ref 43–77)
NRBC # BLD AUTO: 0 K/UL — SIGNIFICANT CHANGE UP (ref 0–0)
NRBC # FLD: 0 K/UL — SIGNIFICANT CHANGE UP (ref 0–0)
NRBC BLD AUTO-RTO: 0 /100 WBCS — SIGNIFICANT CHANGE UP (ref 0–0)
PLATELET # BLD AUTO: 299 K/UL — SIGNIFICANT CHANGE UP (ref 150–400)
PMV BLD: 8.9 FL — SIGNIFICANT CHANGE UP (ref 7–13)
POTASSIUM SERPL-MCNC: 4.4 MMOL/L — SIGNIFICANT CHANGE UP (ref 3.5–5.3)
POTASSIUM SERPL-SCNC: 4.4 MMOL/L — SIGNIFICANT CHANGE UP (ref 3.5–5.3)
RBC # BLD: 3.65 M/UL — LOW (ref 4.2–5.8)
RBC # FLD: 12.5 % — SIGNIFICANT CHANGE UP (ref 10.3–14.5)
SODIUM SERPL-SCNC: 139 MMOL/L — SIGNIFICANT CHANGE UP (ref 135–145)
WBC # BLD: 6.33 K/UL — SIGNIFICANT CHANGE UP (ref 3.8–10.5)
WBC # FLD AUTO: 6.33 K/UL — SIGNIFICANT CHANGE UP (ref 3.8–10.5)

## 2025-08-01 PROCEDURE — 93010 ELECTROCARDIOGRAM REPORT: CPT

## 2025-08-01 PROCEDURE — 36415 COLL VENOUS BLD VENIPUNCTURE: CPT

## 2025-08-01 PROCEDURE — 99214 OFFICE O/P EST MOD 30 MIN: CPT | Mod: 25

## 2025-08-01 PROCEDURE — 83036 HEMOGLOBIN GLYCOSYLATED A1C: CPT

## 2025-08-01 PROCEDURE — 85025 COMPLETE CBC W/AUTO DIFF WBC: CPT

## 2025-08-01 PROCEDURE — 80048 BASIC METABOLIC PNL TOTAL CA: CPT

## 2025-08-01 PROCEDURE — 93005 ELECTROCARDIOGRAM TRACING: CPT

## 2025-08-02 DIAGNOSIS — Z01.818 ENCOUNTER FOR OTHER PREPROCEDURAL EXAMINATION: ICD-10-CM

## 2025-08-02 LAB
A1C WITH ESTIMATED AVERAGE GLUCOSE RESULT: 7.6 % — HIGH (ref 4–5.6)
ESTIMATED AVERAGE GLUCOSE: 171 MG/DL — HIGH (ref 68–114)

## 2025-08-04 ENCOUNTER — APPOINTMENT (OUTPATIENT)
Facility: CLINIC | Age: 80
End: 2025-08-04
Payer: MEDICARE

## 2025-08-04 VITALS — BODY MASS INDEX: 25.11 KG/M2 | WEIGHT: 160 LBS | HEIGHT: 67 IN

## 2025-08-04 DIAGNOSIS — S46.219A STRAIN OF MUSCLE, FASCIA AND TENDON OF OTHER PARTS OF BICEPS, UNSPECIFIED ARM, INITIAL ENCOUNTER: ICD-10-CM

## 2025-08-04 PROCEDURE — 99214 OFFICE O/P EST MOD 30 MIN: CPT

## 2025-08-05 ENCOUNTER — APPOINTMENT (OUTPATIENT)
Dept: ORTHOPEDIC SURGERY | Facility: HOSPITAL | Age: 80
End: 2025-08-05
Payer: MEDICARE

## 2025-08-05 ENCOUNTER — OUTPATIENT (OUTPATIENT)
Dept: INPATIENT UNIT | Facility: HOSPITAL | Age: 80
LOS: 1 days | Discharge: HOME | End: 2025-08-05
Payer: MEDICARE

## 2025-08-05 ENCOUNTER — TRANSCRIPTION ENCOUNTER (OUTPATIENT)
Age: 80
End: 2025-08-05

## 2025-08-05 VITALS
TEMPERATURE: 98 F | OXYGEN SATURATION: 97 % | WEIGHT: 167.99 LBS | SYSTOLIC BLOOD PRESSURE: 183 MMHG | HEIGHT: 68 IN | HEART RATE: 63 BPM | RESPIRATION RATE: 16 BRPM | DIASTOLIC BLOOD PRESSURE: 75 MMHG

## 2025-08-05 DIAGNOSIS — Z90.49 ACQUIRED ABSENCE OF OTHER SPECIFIED PARTS OF DIGESTIVE TRACT: Chronic | ICD-10-CM

## 2025-08-05 DIAGNOSIS — Z95.1 PRESENCE OF AORTOCORONARY BYPASS GRAFT: Chronic | ICD-10-CM

## 2025-08-05 DIAGNOSIS — Z98.890 OTHER SPECIFIED POSTPROCEDURAL STATES: Chronic | ICD-10-CM

## 2025-08-05 DIAGNOSIS — S46.219A STRAIN OF MUSCLE, FASCIA AND TENDON OF OTHER PARTS OF BICEPS, UNSPECIFIED ARM, INITIAL ENCOUNTER: ICD-10-CM

## 2025-08-05 LAB
ANION GAP SERPL CALC-SCNC: 7 MMOL/L — SIGNIFICANT CHANGE UP (ref 5–17)
BUN SERPL-MCNC: 23 MG/DL — SIGNIFICANT CHANGE UP (ref 7–23)
CALCIUM SERPL-MCNC: 9.1 MG/DL — SIGNIFICANT CHANGE UP (ref 8.5–10.1)
CHLORIDE SERPL-SCNC: 106 MMOL/L — SIGNIFICANT CHANGE UP (ref 96–108)
CO2 SERPL-SCNC: 26 MMOL/L — SIGNIFICANT CHANGE UP (ref 22–31)
CREAT SERPL-MCNC: 1.19 MG/DL — SIGNIFICANT CHANGE UP (ref 0.5–1.3)
EGFR: 61 ML/MIN/1.73M2 — SIGNIFICANT CHANGE UP
EGFR: 61 ML/MIN/1.73M2 — SIGNIFICANT CHANGE UP
GLUCOSE BLDC GLUCOMTR-MCNC: 183 MG/DL — HIGH (ref 70–99)
GLUCOSE BLDC GLUCOMTR-MCNC: 183 MG/DL — HIGH (ref 70–99)
GLUCOSE SERPL-MCNC: 195 MG/DL — HIGH (ref 70–99)
HCT VFR BLD CALC: 35.7 % — LOW (ref 39–50)
HGB BLD-MCNC: 11.7 G/DL — LOW (ref 13–17)
MCHC RBC-ENTMCNC: 31.6 PG — SIGNIFICANT CHANGE UP (ref 27–34)
MCHC RBC-ENTMCNC: 32.8 G/DL — SIGNIFICANT CHANGE UP (ref 32–36)
MCV RBC AUTO: 96.5 FL — SIGNIFICANT CHANGE UP (ref 80–100)
NRBC # BLD AUTO: 0 K/UL — SIGNIFICANT CHANGE UP (ref 0–0)
NRBC # FLD: 0 K/UL — SIGNIFICANT CHANGE UP (ref 0–0)
NRBC BLD AUTO-RTO: 0 /100 WBCS — SIGNIFICANT CHANGE UP (ref 0–0)
PLATELET # BLD AUTO: 281 K/UL — SIGNIFICANT CHANGE UP (ref 150–400)
PMV BLD: 8.8 FL — SIGNIFICANT CHANGE UP (ref 7–13)
POTASSIUM SERPL-MCNC: 4.5 MMOL/L — SIGNIFICANT CHANGE UP (ref 3.5–5.3)
POTASSIUM SERPL-SCNC: 4.5 MMOL/L — SIGNIFICANT CHANGE UP (ref 3.5–5.3)
RBC # BLD: 3.7 M/UL — LOW (ref 4.2–5.8)
RBC # FLD: 12.4 % — SIGNIFICANT CHANGE UP (ref 10.3–14.5)
SODIUM SERPL-SCNC: 139 MMOL/L — SIGNIFICANT CHANGE UP (ref 135–145)
WBC # BLD: 13.32 K/UL — HIGH (ref 3.8–10.5)
WBC # FLD AUTO: 13.32 K/UL — HIGH (ref 3.8–10.5)

## 2025-08-05 PROCEDURE — 24342 REPAIR OF RUPTURED TENDON: CPT | Mod: AS,LT

## 2025-08-05 PROCEDURE — 76000 FLUOROSCOPY <1 HR PHYS/QHP: CPT

## 2025-08-05 PROCEDURE — 85027 COMPLETE CBC AUTOMATED: CPT

## 2025-08-05 PROCEDURE — 80048 BASIC METABOLIC PNL TOTAL CA: CPT

## 2025-08-05 PROCEDURE — 82962 GLUCOSE BLOOD TEST: CPT

## 2025-08-05 PROCEDURE — 70450 CT HEAD/BRAIN W/O DYE: CPT

## 2025-08-05 PROCEDURE — 24342 REPAIR OF RUPTURED TENDON: CPT | Mod: LT

## 2025-08-05 PROCEDURE — C1713: CPT

## 2025-08-05 PROCEDURE — 36415 COLL VENOUS BLD VENIPUNCTURE: CPT

## 2025-08-05 RX ORDER — DEXTROSE 50 % IN WATER 50 %
12.5 SYRINGE (ML) INTRAVENOUS ONCE
Refills: 0 | Status: DISCONTINUED | OUTPATIENT
Start: 2025-08-05 | End: 2025-08-06

## 2025-08-05 RX ORDER — DEXTROSE 50 % IN WATER 50 %
15 SYRINGE (ML) INTRAVENOUS ONCE
Refills: 0 | Status: DISCONTINUED | OUTPATIENT
Start: 2025-08-05 | End: 2025-08-06

## 2025-08-05 RX ORDER — DEXTROSE 50 % IN WATER 50 %
25 SYRINGE (ML) INTRAVENOUS ONCE
Refills: 0 | Status: DISCONTINUED | OUTPATIENT
Start: 2025-08-05 | End: 2025-08-06

## 2025-08-05 RX ORDER — ACETAMINOPHEN 500 MG/5ML
1000 LIQUID (ML) ORAL EVERY 8 HOURS
Refills: 0 | Status: COMPLETED | OUTPATIENT
Start: 2025-08-05 | End: 2025-08-06

## 2025-08-05 RX ORDER — OXYCODONE HYDROCHLORIDE 30 MG/1
5 TABLET ORAL EVERY 4 HOURS
Refills: 0 | Status: DISCONTINUED | OUTPATIENT
Start: 2025-08-05 | End: 2025-08-06

## 2025-08-05 RX ORDER — HYDROMORPHONE/SOD CHLOR,ISO/PF 2 MG/10 ML
0.5 SYRINGE (ML) INJECTION
Refills: 0 | Status: DISCONTINUED | OUTPATIENT
Start: 2025-08-05 | End: 2025-08-05

## 2025-08-05 RX ORDER — SODIUM CHLORIDE 9 G/1000ML
1000 INJECTION, SOLUTION INTRAVENOUS
Refills: 0 | Status: DISCONTINUED | OUTPATIENT
Start: 2025-08-05 | End: 2025-08-06

## 2025-08-05 RX ORDER — METOPROLOL SUCCINATE 50 MG/1
25 TABLET, EXTENDED RELEASE ORAL DAILY
Refills: 0 | Status: DISCONTINUED | OUTPATIENT
Start: 2025-08-05 | End: 2025-08-06

## 2025-08-05 RX ORDER — POLYETHYLENE GLYCOL 3350 17 G/17G
17 POWDER, FOR SOLUTION ORAL
Qty: 119 | Refills: 0
Start: 2025-08-05 | End: 2025-08-11

## 2025-08-05 RX ORDER — ACETAMINOPHEN 500 MG/5ML
2 LIQUID (ML) ORAL
Qty: 84 | Refills: 0
Start: 2025-08-05 | End: 2025-08-18

## 2025-08-05 RX ORDER — ACETAMINOPHEN 500 MG/5ML
2 LIQUID (ML) ORAL
Refills: 0 | DISCHARGE

## 2025-08-05 RX ORDER — ASPIRIN 325 MG
81 TABLET ORAL DAILY
Refills: 0 | Status: DISCONTINUED | OUTPATIENT
Start: 2025-08-06 | End: 2025-08-06

## 2025-08-05 RX ORDER — DILTIAZEM HYDROCHLORIDE 240 MG/1
120 TABLET, EXTENDED RELEASE ORAL DAILY
Refills: 0 | Status: DISCONTINUED | OUTPATIENT
Start: 2025-08-05 | End: 2025-08-06

## 2025-08-05 RX ORDER — ONDANSETRON HCL/PF 4 MG/2 ML
4 VIAL (ML) INJECTION ONCE
Refills: 0 | Status: DISCONTINUED | OUTPATIENT
Start: 2025-08-05 | End: 2025-08-05

## 2025-08-05 RX ORDER — SENNA 187 MG
2 TABLET ORAL
Qty: 14 | Refills: 0
Start: 2025-08-05 | End: 2025-08-11

## 2025-08-05 RX ORDER — DILTIAZEM HYDROCHLORIDE 240 MG/1
120 TABLET, EXTENDED RELEASE ORAL DAILY
Refills: 0 | Status: DISCONTINUED | OUTPATIENT
Start: 2025-08-05 | End: 2025-08-05

## 2025-08-05 RX ORDER — OXYCODONE HYDROCHLORIDE 30 MG/1
5 TABLET ORAL ONCE
Refills: 0 | Status: DISCONTINUED | OUTPATIENT
Start: 2025-08-05 | End: 2025-08-05

## 2025-08-05 RX ORDER — ASPIRIN 325 MG
1 TABLET ORAL
Qty: 56 | Refills: 0
Start: 2025-08-05 | End: 2025-09-01

## 2025-08-05 RX ORDER — CEFAZOLIN SODIUM IN 0.9 % NACL 3 G/100 ML
2000 INTRAVENOUS SOLUTION, PIGGYBACK (ML) INTRAVENOUS EVERY 8 HOURS
Refills: 0 | Status: COMPLETED | OUTPATIENT
Start: 2025-08-05 | End: 2025-08-06

## 2025-08-05 RX ORDER — FENTANYL CITRATE-0.9 % NACL/PF 100MCG/2ML
50 SYRINGE (ML) INTRAVENOUS
Refills: 0 | Status: DISCONTINUED | OUTPATIENT
Start: 2025-08-05 | End: 2025-08-05

## 2025-08-05 RX ORDER — INSULIN LISPRO 100 U/ML
INJECTION, SOLUTION INTRAVENOUS; SUBCUTANEOUS AT BEDTIME
Refills: 0 | Status: DISCONTINUED | OUTPATIENT
Start: 2025-08-05 | End: 2025-08-06

## 2025-08-05 RX ORDER — CEFAZOLIN SODIUM IN 0.9 % NACL 3 G/100 ML
2000 INTRAVENOUS SOLUTION, PIGGYBACK (ML) INTRAVENOUS EVERY 8 HOURS
Refills: 0 | Status: DISCONTINUED | OUTPATIENT
Start: 2025-08-05 | End: 2025-08-05

## 2025-08-05 RX ORDER — OXYCODONE HYDROCHLORIDE 30 MG/1
1 TABLET ORAL
Qty: 20 | Refills: 0
Start: 2025-08-05 | End: 2025-08-09

## 2025-08-05 RX ORDER — GLUCAGON 3 MG/1
1 POWDER NASAL ONCE
Refills: 0 | Status: DISCONTINUED | OUTPATIENT
Start: 2025-08-05 | End: 2025-08-06

## 2025-08-05 RX ORDER — INSULIN LISPRO 100 U/ML
INJECTION, SOLUTION INTRAVENOUS; SUBCUTANEOUS
Refills: 0 | Status: DISCONTINUED | OUTPATIENT
Start: 2025-08-05 | End: 2025-08-06

## 2025-08-05 RX ADMIN — Medication 0.5 MILLIGRAM(S): at 19:06

## 2025-08-05 RX ADMIN — Medication 0.5 MILLIGRAM(S): at 18:22

## 2025-08-05 RX ADMIN — OXYCODONE HYDROCHLORIDE 5 MILLIGRAM(S): 30 TABLET ORAL at 21:28

## 2025-08-05 RX ADMIN — OXYCODONE HYDROCHLORIDE 5 MILLIGRAM(S): 30 TABLET ORAL at 21:58

## 2025-08-05 RX ADMIN — METOPROLOL SUCCINATE 25 MILLIGRAM(S): 50 TABLET, EXTENDED RELEASE ORAL at 21:29

## 2025-08-05 RX ADMIN — Medication 0.5 MILLIGRAM(S): at 18:44

## 2025-08-05 RX ADMIN — Medication 400 MILLIGRAM(S): at 23:59

## 2025-08-05 RX ADMIN — Medication 50 MICROGRAM(S): at 16:30

## 2025-08-05 RX ADMIN — OXYCODONE HYDROCHLORIDE 5 MILLIGRAM(S): 30 TABLET ORAL at 16:50

## 2025-08-05 RX ADMIN — Medication 10 MILLIGRAM(S): at 17:45

## 2025-08-05 RX ADMIN — OXYCODONE HYDROCHLORIDE 5 MILLIGRAM(S): 30 TABLET ORAL at 17:30

## 2025-08-05 RX ADMIN — Medication 0.5 MILLIGRAM(S): at 18:45

## 2025-08-05 RX ADMIN — Medication 50 MICROGRAM(S): at 16:15

## 2025-08-05 RX ADMIN — Medication 2000 MILLIGRAM(S): at 22:47

## 2025-08-06 ENCOUNTER — TRANSCRIPTION ENCOUNTER (OUTPATIENT)
Age: 80
End: 2025-08-06

## 2025-08-06 VITALS
SYSTOLIC BLOOD PRESSURE: 176 MMHG | RESPIRATION RATE: 18 BRPM | HEART RATE: 56 BPM | DIASTOLIC BLOOD PRESSURE: 55 MMHG | OXYGEN SATURATION: 96 % | TEMPERATURE: 97 F

## 2025-08-06 LAB
ANION GAP SERPL CALC-SCNC: 8 MMOL/L — SIGNIFICANT CHANGE UP (ref 5–17)
BUN SERPL-MCNC: 22 MG/DL — SIGNIFICANT CHANGE UP (ref 7–23)
CALCIUM SERPL-MCNC: 9 MG/DL — SIGNIFICANT CHANGE UP (ref 8.5–10.1)
CHLORIDE SERPL-SCNC: 104 MMOL/L — SIGNIFICANT CHANGE UP (ref 96–108)
CO2 SERPL-SCNC: 25 MMOL/L — SIGNIFICANT CHANGE UP (ref 22–31)
CREAT SERPL-MCNC: 1.39 MG/DL — HIGH (ref 0.5–1.3)
EGFR: 51 ML/MIN/1.73M2 — LOW
EGFR: 51 ML/MIN/1.73M2 — LOW
GLUCOSE BLDC GLUCOMTR-MCNC: 158 MG/DL — HIGH (ref 70–99)
GLUCOSE BLDC GLUCOMTR-MCNC: 187 MG/DL — HIGH (ref 70–99)
GLUCOSE SERPL-MCNC: 187 MG/DL — HIGH (ref 70–99)
HCT VFR BLD CALC: 38.2 % — LOW (ref 39–50)
HGB BLD-MCNC: 12.4 G/DL — LOW (ref 13–17)
MCHC RBC-ENTMCNC: 31.6 PG — SIGNIFICANT CHANGE UP (ref 27–34)
MCHC RBC-ENTMCNC: 32.5 G/DL — SIGNIFICANT CHANGE UP (ref 32–36)
MCV RBC AUTO: 97.2 FL — SIGNIFICANT CHANGE UP (ref 80–100)
NRBC # BLD AUTO: 0 K/UL — SIGNIFICANT CHANGE UP (ref 0–0)
NRBC # FLD: 0 K/UL — SIGNIFICANT CHANGE UP (ref 0–0)
NRBC BLD AUTO-RTO: 0 /100 WBCS — SIGNIFICANT CHANGE UP (ref 0–0)
PLATELET # BLD AUTO: 282 K/UL — SIGNIFICANT CHANGE UP (ref 150–400)
PMV BLD: 8.9 FL — SIGNIFICANT CHANGE UP (ref 7–13)
POTASSIUM SERPL-MCNC: 4 MMOL/L — SIGNIFICANT CHANGE UP (ref 3.5–5.3)
POTASSIUM SERPL-SCNC: 4 MMOL/L — SIGNIFICANT CHANGE UP (ref 3.5–5.3)
RBC # BLD: 3.93 M/UL — LOW (ref 4.2–5.8)
RBC # FLD: 12.3 % — SIGNIFICANT CHANGE UP (ref 10.3–14.5)
SODIUM SERPL-SCNC: 137 MMOL/L — SIGNIFICANT CHANGE UP (ref 135–145)
WBC # BLD: 9.79 K/UL — SIGNIFICANT CHANGE UP (ref 3.8–10.5)
WBC # FLD AUTO: 9.79 K/UL — SIGNIFICANT CHANGE UP (ref 3.8–10.5)

## 2025-08-06 PROCEDURE — 70450 CT HEAD/BRAIN W/O DYE: CPT | Mod: 26

## 2025-08-06 RX ORDER — ONDANSETRON HCL/PF 4 MG/2 ML
4 VIAL (ML) INJECTION EVERY 4 HOURS
Refills: 0 | Status: DISCONTINUED | OUTPATIENT
Start: 2025-08-06 | End: 2025-08-06

## 2025-08-06 RX ORDER — RIVAROXABAN 10 MG/1
1 TABLET, FILM COATED ORAL
Qty: 0 | Refills: 0 | DISCHARGE
Start: 2025-08-06

## 2025-08-06 RX ORDER — METOPROLOL SUCCINATE 50 MG/1
1 TABLET, EXTENDED RELEASE ORAL
Qty: 0 | Refills: 0 | DISCHARGE
Start: 2025-08-06

## 2025-08-06 RX ORDER — ROSUVASTATIN CALCIUM 20 MG/1
40 TABLET, FILM COATED ORAL
Qty: 1 | Refills: 0
Start: 2025-08-06

## 2025-08-06 RX ORDER — DILTIAZEM HYDROCHLORIDE 240 MG/1
1 TABLET, EXTENDED RELEASE ORAL
Qty: 0 | Refills: 0 | DISCHARGE
Start: 2025-08-06

## 2025-08-06 RX ORDER — ONDANSETRON HCL/PF 4 MG/2 ML
1 VIAL (ML) INJECTION
Qty: 10 | Refills: 0
Start: 2025-08-06

## 2025-08-06 RX ORDER — RIVAROXABAN 10 MG/1
2.5 TABLET, FILM COATED ORAL
Refills: 0 | Status: DISCONTINUED | OUTPATIENT
Start: 2025-08-06 | End: 2025-08-06

## 2025-08-06 RX ORDER — IBUPROFEN 200 MG
1 TABLET ORAL
Refills: 0 | DISCHARGE

## 2025-08-06 RX ORDER — ASPIRIN 325 MG
1 TABLET ORAL
Qty: 28 | Refills: 0
Start: 2025-08-06 | End: 2025-09-02

## 2025-08-06 RX ADMIN — INSULIN LISPRO 1: 100 INJECTION, SOLUTION INTRAVENOUS; SUBCUTANEOUS at 08:27

## 2025-08-06 RX ADMIN — RIVAROXABAN 2.5 MILLIGRAM(S): 10 TABLET, FILM COATED ORAL at 10:05

## 2025-08-06 RX ADMIN — METOPROLOL SUCCINATE 25 MILLIGRAM(S): 50 TABLET, EXTENDED RELEASE ORAL at 10:05

## 2025-08-06 RX ADMIN — Medication 2000 MILLIGRAM(S): at 06:07

## 2025-08-06 RX ADMIN — Medication 1000 MILLIGRAM(S): at 00:29

## 2025-08-06 RX ADMIN — DILTIAZEM HYDROCHLORIDE 120 MILLIGRAM(S): 240 TABLET, EXTENDED RELEASE ORAL at 02:19

## 2025-08-06 RX ADMIN — Medication 10 MILLIGRAM(S): at 00:22

## 2025-08-06 RX ADMIN — Medication 81 MILLIGRAM(S): at 10:04

## 2025-08-06 RX ADMIN — Medication 4 MILLIGRAM(S): at 00:18

## 2025-08-06 RX ADMIN — INSULIN LISPRO 1: 100 INJECTION, SOLUTION INTRAVENOUS; SUBCUTANEOUS at 12:12

## 2025-08-06 RX ADMIN — Medication 1000 MILLIGRAM(S): at 10:16

## 2025-08-06 RX ADMIN — Medication 400 MILLIGRAM(S): at 10:16

## 2025-08-07 ENCOUNTER — EMERGENCY (EMERGENCY)
Facility: HOSPITAL | Age: 80
LOS: 0 days | Discharge: HOME | End: 2025-08-08
Attending: EMERGENCY MEDICINE
Payer: MEDICARE

## 2025-08-07 VITALS
HEIGHT: 68 IN | SYSTOLIC BLOOD PRESSURE: 223 MMHG | OXYGEN SATURATION: 97 % | HEART RATE: 65 BPM | RESPIRATION RATE: 18 BRPM | DIASTOLIC BLOOD PRESSURE: 73 MMHG | TEMPERATURE: 98 F

## 2025-08-07 DIAGNOSIS — Z98.890 OTHER SPECIFIED POSTPROCEDURAL STATES: Chronic | ICD-10-CM

## 2025-08-07 DIAGNOSIS — Z79.01 LONG TERM (CURRENT) USE OF ANTICOAGULANTS: ICD-10-CM

## 2025-08-07 DIAGNOSIS — Z95.1 PRESENCE OF AORTOCORONARY BYPASS GRAFT: Chronic | ICD-10-CM

## 2025-08-07 DIAGNOSIS — Z90.49 ACQUIRED ABSENCE OF OTHER SPECIFIED PARTS OF DIGESTIVE TRACT: Chronic | ICD-10-CM

## 2025-08-07 DIAGNOSIS — R51.9 HEADACHE, UNSPECIFIED: ICD-10-CM

## 2025-08-07 DIAGNOSIS — G44.89 OTHER HEADACHE SYNDROME: ICD-10-CM

## 2025-08-07 LAB
ALBUMIN SERPL ELPH-MCNC: 3.8 G/DL — SIGNIFICANT CHANGE UP (ref 3.3–5)
ALP SERPL-CCNC: 86 U/L — SIGNIFICANT CHANGE UP (ref 40–120)
ALT FLD-CCNC: 15 U/L — SIGNIFICANT CHANGE UP (ref 12–78)
ANION GAP SERPL CALC-SCNC: 4 MMOL/L — LOW (ref 5–17)
AST SERPL-CCNC: 28 U/L — SIGNIFICANT CHANGE UP (ref 15–37)
BASOPHILS # BLD AUTO: 0.04 K/UL — SIGNIFICANT CHANGE UP (ref 0–0.2)
BASOPHILS NFR BLD AUTO: 0.3 % — SIGNIFICANT CHANGE UP (ref 0–2)
BILIRUB SERPL-MCNC: 0.5 MG/DL — SIGNIFICANT CHANGE UP (ref 0.2–1.2)
BUN SERPL-MCNC: 31 MG/DL — HIGH (ref 7–23)
CALCIUM SERPL-MCNC: 9 MG/DL — SIGNIFICANT CHANGE UP (ref 8.5–10.1)
CHLORIDE SERPL-SCNC: 103 MMOL/L — SIGNIFICANT CHANGE UP (ref 96–108)
CO2 SERPL-SCNC: 30 MMOL/L — SIGNIFICANT CHANGE UP (ref 22–31)
CREAT SERPL-MCNC: 1.47 MG/DL — HIGH (ref 0.5–1.3)
EGFR: 48 ML/MIN/1.73M2 — LOW
EGFR: 48 ML/MIN/1.73M2 — LOW
EOSINOPHIL # BLD AUTO: 0.11 K/UL — SIGNIFICANT CHANGE UP (ref 0–0.5)
EOSINOPHIL NFR BLD AUTO: 0.9 % — SIGNIFICANT CHANGE UP (ref 0–6)
GLUCOSE SERPL-MCNC: 147 MG/DL — HIGH (ref 70–99)
HCT VFR BLD CALC: 37.8 % — LOW (ref 39–50)
HGB BLD-MCNC: 12.3 G/DL — LOW (ref 13–17)
IMM GRANULOCYTES # BLD AUTO: 0.06 K/UL — SIGNIFICANT CHANGE UP (ref 0–0.07)
IMM GRANULOCYTES NFR BLD AUTO: 0.5 % — SIGNIFICANT CHANGE UP (ref 0–0.9)
LYMPHOCYTES # BLD AUTO: 1.11 K/UL — SIGNIFICANT CHANGE UP (ref 1–3.3)
LYMPHOCYTES NFR BLD AUTO: 8.9 % — LOW (ref 13–44)
MCHC RBC-ENTMCNC: 31.7 PG — SIGNIFICANT CHANGE UP (ref 27–34)
MCHC RBC-ENTMCNC: 32.5 G/DL — SIGNIFICANT CHANGE UP (ref 32–36)
MCV RBC AUTO: 97.4 FL — SIGNIFICANT CHANGE UP (ref 80–100)
MONOCYTES # BLD AUTO: 1.07 K/UL — HIGH (ref 0–0.9)
MONOCYTES NFR BLD AUTO: 8.5 % — SIGNIFICANT CHANGE UP (ref 2–14)
NEUTROPHILS # BLD AUTO: 10.13 K/UL — HIGH (ref 1.8–7.4)
NEUTROPHILS NFR BLD AUTO: 80.9 % — HIGH (ref 43–77)
NRBC # BLD AUTO: 0 K/UL — SIGNIFICANT CHANGE UP (ref 0–0)
NRBC # FLD: 0 K/UL — SIGNIFICANT CHANGE UP (ref 0–0)
NRBC BLD AUTO-RTO: 0 /100 WBCS — SIGNIFICANT CHANGE UP (ref 0–0)
PLATELET # BLD AUTO: 298 K/UL — SIGNIFICANT CHANGE UP (ref 150–400)
PMV BLD: 8.7 FL — SIGNIFICANT CHANGE UP (ref 7–13)
POTASSIUM SERPL-MCNC: 4.9 MMOL/L — SIGNIFICANT CHANGE UP (ref 3.5–5.3)
POTASSIUM SERPL-SCNC: 4.9 MMOL/L — SIGNIFICANT CHANGE UP (ref 3.5–5.3)
PROT SERPL-MCNC: 7.8 GM/DL — SIGNIFICANT CHANGE UP (ref 6–8.3)
RBC # BLD: 3.88 M/UL — LOW (ref 4.2–5.8)
RBC # FLD: 12.3 % — SIGNIFICANT CHANGE UP (ref 10.3–14.5)
SODIUM SERPL-SCNC: 137 MMOL/L — SIGNIFICANT CHANGE UP (ref 135–145)
WBC # BLD: 12.52 K/UL — HIGH (ref 3.8–10.5)
WBC # FLD AUTO: 12.52 K/UL — HIGH (ref 3.8–10.5)

## 2025-08-07 PROCEDURE — 80053 COMPREHEN METABOLIC PANEL: CPT

## 2025-08-07 PROCEDURE — 93005 ELECTROCARDIOGRAM TRACING: CPT

## 2025-08-07 PROCEDURE — 36415 COLL VENOUS BLD VENIPUNCTURE: CPT

## 2025-08-07 PROCEDURE — 99284 EMERGENCY DEPT VISIT MOD MDM: CPT

## 2025-08-07 PROCEDURE — 85025 COMPLETE CBC W/AUTO DIFF WBC: CPT

## 2025-08-07 PROCEDURE — 96374 THER/PROPH/DIAG INJ IV PUSH: CPT

## 2025-08-07 PROCEDURE — 70450 CT HEAD/BRAIN W/O DYE: CPT | Mod: 26

## 2025-08-07 PROCEDURE — 96375 TX/PRO/DX INJ NEW DRUG ADDON: CPT

## 2025-08-07 PROCEDURE — 93010 ELECTROCARDIOGRAM REPORT: CPT

## 2025-08-07 PROCEDURE — 70450 CT HEAD/BRAIN W/O DYE: CPT

## 2025-08-07 PROCEDURE — 99285 EMERGENCY DEPT VISIT HI MDM: CPT | Mod: 25

## 2025-08-07 RX ORDER — METOCLOPRAMIDE HCL 10 MG
10 TABLET ORAL ONCE
Refills: 0 | Status: COMPLETED | OUTPATIENT
Start: 2025-08-07 | End: 2025-08-07

## 2025-08-07 RX ORDER — ACETAMINOPHEN 500 MG/5ML
1000 LIQUID (ML) ORAL ONCE
Refills: 0 | Status: COMPLETED | OUTPATIENT
Start: 2025-08-07 | End: 2025-08-07

## 2025-08-07 RX ORDER — DIPHENHYDRAMINE HCL 12.5MG/5ML
25 ELIXIR ORAL ONCE
Refills: 0 | Status: COMPLETED | OUTPATIENT
Start: 2025-08-07 | End: 2025-08-07

## 2025-08-07 RX ADMIN — Medication 25 MILLIGRAM(S): at 23:19

## 2025-08-07 RX ADMIN — Medication 10 MILLIGRAM(S): at 23:19

## 2025-08-07 RX ADMIN — Medication 2000 MILLILITER(S): at 23:19

## 2025-08-08 VITALS
OXYGEN SATURATION: 97 % | DIASTOLIC BLOOD PRESSURE: 65 MMHG | HEART RATE: 60 BPM | RESPIRATION RATE: 18 BRPM | SYSTOLIC BLOOD PRESSURE: 170 MMHG | TEMPERATURE: 98 F

## 2025-08-08 PROBLEM — S46.212A STRAIN OF MUSCLE, FASCIA AND TENDON OF OTHER PARTS OF BICEPS, LEFT ARM, INITIAL ENCOUNTER: Chronic | Status: ACTIVE | Noted: 2025-08-01

## 2025-08-08 PROBLEM — E11.9 TYPE 2 DIABETES MELLITUS WITHOUT COMPLICATIONS: Chronic | Status: ACTIVE | Noted: 2025-08-01

## 2025-08-08 RX ADMIN — Medication 400 MILLIGRAM(S): at 00:14

## 2025-08-11 RX ORDER — RAMIPRIL 2.5 MG/1
1 CAPSULE ORAL
Qty: 0 | Refills: 0 | DISCHARGE

## 2025-08-19 ENCOUNTER — APPOINTMENT (OUTPATIENT)
Facility: CLINIC | Age: 80
End: 2025-08-19
Payer: MEDICARE

## 2025-08-19 VITALS — WEIGHT: 160 LBS | HEIGHT: 67 IN | BODY MASS INDEX: 25.11 KG/M2

## 2025-08-19 DIAGNOSIS — S46.219A STRAIN OF MUSCLE, FASCIA AND TENDON OF OTHER PARTS OF BICEPS, UNSPECIFIED ARM, INITIAL ENCOUNTER: ICD-10-CM

## 2025-08-19 PROCEDURE — 99024 POSTOP FOLLOW-UP VISIT: CPT

## 2025-08-24 ENCOUNTER — EMERGENCY (EMERGENCY)
Facility: HOSPITAL | Age: 80
LOS: 0 days | Discharge: HOME | End: 2025-08-24
Attending: EMERGENCY MEDICINE
Payer: MEDICARE

## 2025-08-24 VITALS
RESPIRATION RATE: 20 BRPM | OXYGEN SATURATION: 96 % | SYSTOLIC BLOOD PRESSURE: 198 MMHG | HEART RATE: 60 BPM | TEMPERATURE: 98 F | DIASTOLIC BLOOD PRESSURE: 58 MMHG

## 2025-08-24 VITALS — HEIGHT: 68 IN

## 2025-08-24 DIAGNOSIS — R51.9 HEADACHE, UNSPECIFIED: ICD-10-CM

## 2025-08-24 DIAGNOSIS — Z90.49 ACQUIRED ABSENCE OF OTHER SPECIFIED PARTS OF DIGESTIVE TRACT: Chronic | ICD-10-CM

## 2025-08-24 DIAGNOSIS — Z79.01 LONG TERM (CURRENT) USE OF ANTICOAGULANTS: ICD-10-CM

## 2025-08-24 DIAGNOSIS — I10 ESSENTIAL (PRIMARY) HYPERTENSION: ICD-10-CM

## 2025-08-24 DIAGNOSIS — Z98.890 OTHER SPECIFIED POSTPROCEDURAL STATES: Chronic | ICD-10-CM

## 2025-08-24 DIAGNOSIS — Z95.1 PRESENCE OF AORTOCORONARY BYPASS GRAFT: Chronic | ICD-10-CM

## 2025-08-24 DIAGNOSIS — I45.10 UNSPECIFIED RIGHT BUNDLE-BRANCH BLOCK: ICD-10-CM

## 2025-08-24 LAB
ALBUMIN SERPL ELPH-MCNC: 4.2 G/DL — SIGNIFICANT CHANGE UP (ref 3.3–5)
ALP SERPL-CCNC: 113 U/L — SIGNIFICANT CHANGE UP (ref 40–120)
ALT FLD-CCNC: 19 U/L — SIGNIFICANT CHANGE UP (ref 12–78)
ANION GAP SERPL CALC-SCNC: 5 MMOL/L — SIGNIFICANT CHANGE UP (ref 5–17)
APPEARANCE UR: CLEAR — SIGNIFICANT CHANGE UP
AST SERPL-CCNC: 12 U/L — LOW (ref 15–37)
BASOPHILS # BLD AUTO: 0.06 K/UL — SIGNIFICANT CHANGE UP (ref 0–0.2)
BASOPHILS NFR BLD AUTO: 0.8 % — SIGNIFICANT CHANGE UP (ref 0–2)
BILIRUB SERPL-MCNC: 0.3 MG/DL — SIGNIFICANT CHANGE UP (ref 0.2–1.2)
BILIRUB UR-MCNC: NEGATIVE — SIGNIFICANT CHANGE UP
BUN SERPL-MCNC: 25 MG/DL — HIGH (ref 7–23)
CALCIUM SERPL-MCNC: 9.8 MG/DL — SIGNIFICANT CHANGE UP (ref 8.5–10.1)
CHLORIDE SERPL-SCNC: 104 MMOL/L — SIGNIFICANT CHANGE UP (ref 96–108)
CO2 SERPL-SCNC: 27 MMOL/L — SIGNIFICANT CHANGE UP (ref 22–31)
COLOR SPEC: YELLOW — SIGNIFICANT CHANGE UP
CREAT SERPL-MCNC: 1.28 MG/DL — SIGNIFICANT CHANGE UP (ref 0.5–1.3)
DIFF PNL FLD: NEGATIVE — SIGNIFICANT CHANGE UP
EGFR: 56 ML/MIN/1.73M2 — LOW
EGFR: 56 ML/MIN/1.73M2 — LOW
EOSINOPHIL # BLD AUTO: 0.08 K/UL — SIGNIFICANT CHANGE UP (ref 0–0.5)
EOSINOPHIL NFR BLD AUTO: 1 % — SIGNIFICANT CHANGE UP (ref 0–6)
GLUCOSE SERPL-MCNC: 128 MG/DL — HIGH (ref 70–99)
GLUCOSE UR QL: NEGATIVE MG/DL — SIGNIFICANT CHANGE UP
HCT VFR BLD CALC: 39.2 % — SIGNIFICANT CHANGE UP (ref 39–50)
HGB BLD-MCNC: 12.8 G/DL — LOW (ref 13–17)
IMM GRANULOCYTES # BLD AUTO: 0.06 K/UL — SIGNIFICANT CHANGE UP (ref 0–0.07)
IMM GRANULOCYTES NFR BLD AUTO: 0.8 % — SIGNIFICANT CHANGE UP (ref 0–0.9)
KETONES UR QL: NEGATIVE MG/DL — SIGNIFICANT CHANGE UP
LEUKOCYTE ESTERASE UR-ACNC: NEGATIVE — SIGNIFICANT CHANGE UP
LYMPHOCYTES # BLD AUTO: 1.66 K/UL — SIGNIFICANT CHANGE UP (ref 1–3.3)
LYMPHOCYTES NFR BLD AUTO: 20.9 % — SIGNIFICANT CHANGE UP (ref 13–44)
MCHC RBC-ENTMCNC: 31.4 PG — SIGNIFICANT CHANGE UP (ref 27–34)
MCHC RBC-ENTMCNC: 32.7 G/DL — SIGNIFICANT CHANGE UP (ref 32–36)
MCV RBC AUTO: 96.3 FL — SIGNIFICANT CHANGE UP (ref 80–100)
MONOCYTES # BLD AUTO: 0.77 K/UL — SIGNIFICANT CHANGE UP (ref 0–0.9)
MONOCYTES NFR BLD AUTO: 9.7 % — SIGNIFICANT CHANGE UP (ref 2–14)
NEUTROPHILS # BLD AUTO: 5.32 K/UL — SIGNIFICANT CHANGE UP (ref 1.8–7.4)
NEUTROPHILS NFR BLD AUTO: 66.8 % — SIGNIFICANT CHANGE UP (ref 43–77)
NITRITE UR-MCNC: NEGATIVE — SIGNIFICANT CHANGE UP
NRBC # BLD AUTO: 0 K/UL — SIGNIFICANT CHANGE UP (ref 0–0)
NRBC # FLD: 0 K/UL — SIGNIFICANT CHANGE UP (ref 0–0)
NRBC BLD AUTO-RTO: 0 /100 WBCS — SIGNIFICANT CHANGE UP (ref 0–0)
PH UR: 7.5 — SIGNIFICANT CHANGE UP (ref 5–8)
PLATELET # BLD AUTO: 381 K/UL — SIGNIFICANT CHANGE UP (ref 150–400)
PMV BLD: 8.6 FL — SIGNIFICANT CHANGE UP (ref 7–13)
POTASSIUM SERPL-MCNC: 4.5 MMOL/L — SIGNIFICANT CHANGE UP (ref 3.5–5.3)
POTASSIUM SERPL-SCNC: 4.5 MMOL/L — SIGNIFICANT CHANGE UP (ref 3.5–5.3)
PROT SERPL-MCNC: 8.3 GM/DL — SIGNIFICANT CHANGE UP (ref 6–8.3)
PROT UR-MCNC: NEGATIVE MG/DL — SIGNIFICANT CHANGE UP
RBC # BLD: 4.07 M/UL — LOW (ref 4.2–5.8)
RBC # FLD: 12.3 % — SIGNIFICANT CHANGE UP (ref 10.3–14.5)
SODIUM SERPL-SCNC: 136 MMOL/L — SIGNIFICANT CHANGE UP (ref 135–145)
SP GR SPEC: 1.01 — SIGNIFICANT CHANGE UP (ref 1–1.03)
TROPONIN I, HIGH SENSITIVITY RESULT: 12.3 NG/L — SIGNIFICANT CHANGE UP
UROBILINOGEN FLD QL: 0.2 MG/DL — SIGNIFICANT CHANGE UP (ref 0.2–1)
WBC # BLD: 7.95 K/UL — SIGNIFICANT CHANGE UP (ref 3.8–10.5)
WBC # FLD AUTO: 7.95 K/UL — SIGNIFICANT CHANGE UP (ref 3.8–10.5)

## 2025-08-24 PROCEDURE — 81003 URINALYSIS AUTO W/O SCOPE: CPT

## 2025-08-24 PROCEDURE — 70450 CT HEAD/BRAIN W/O DYE: CPT

## 2025-08-24 PROCEDURE — 99285 EMERGENCY DEPT VISIT HI MDM: CPT | Mod: 25

## 2025-08-24 PROCEDURE — 99285 EMERGENCY DEPT VISIT HI MDM: CPT | Mod: FS

## 2025-08-24 PROCEDURE — 70450 CT HEAD/BRAIN W/O DYE: CPT | Mod: 26

## 2025-08-24 PROCEDURE — 93005 ELECTROCARDIOGRAM TRACING: CPT

## 2025-08-24 PROCEDURE — 93010 ELECTROCARDIOGRAM REPORT: CPT

## 2025-08-24 PROCEDURE — 85025 COMPLETE CBC W/AUTO DIFF WBC: CPT

## 2025-08-24 PROCEDURE — 84484 ASSAY OF TROPONIN QUANT: CPT

## 2025-08-24 PROCEDURE — 80053 COMPREHEN METABOLIC PANEL: CPT

## 2025-08-24 PROCEDURE — 36415 COLL VENOUS BLD VENIPUNCTURE: CPT

## 2025-08-24 RX ADMIN — Medication 10 MILLIGRAM(S): at 15:06

## 2025-09-03 ENCOUNTER — APPOINTMENT (OUTPATIENT)
Facility: CLINIC | Age: 80
End: 2025-09-03
Payer: MEDICARE

## 2025-09-03 VITALS — HEIGHT: 67 IN | BODY MASS INDEX: 25.11 KG/M2 | WEIGHT: 160 LBS

## 2025-09-03 DIAGNOSIS — S46.219A STRAIN OF MUSCLE, FASCIA AND TENDON OF OTHER PARTS OF BICEPS, UNSPECIFIED ARM, INITIAL ENCOUNTER: ICD-10-CM

## 2025-09-03 PROCEDURE — 99024 POSTOP FOLLOW-UP VISIT: CPT
